# Patient Record
Sex: MALE | Race: ASIAN
[De-identification: names, ages, dates, MRNs, and addresses within clinical notes are randomized per-mention and may not be internally consistent; named-entity substitution may affect disease eponyms.]

---

## 2019-11-20 ENCOUNTER — HOSPITAL ENCOUNTER (INPATIENT)
Dept: HOSPITAL 54 - ER | Age: 79
LOS: 2 days | Discharge: ANOTHER HOSPITAL | DRG: 286 | End: 2019-11-22
Attending: NURSE PRACTITIONER | Admitting: FAMILY MEDICINE
Payer: MEDICARE

## 2019-11-20 VITALS — DIASTOLIC BLOOD PRESSURE: 91 MMHG | SYSTOLIC BLOOD PRESSURE: 144 MMHG

## 2019-11-20 VITALS — HEIGHT: 67 IN | WEIGHT: 156 LBS | BODY MASS INDEX: 24.48 KG/M2

## 2019-11-20 DIAGNOSIS — Z98.890: ICD-10-CM

## 2019-11-20 DIAGNOSIS — Z87.891: ICD-10-CM

## 2019-11-20 DIAGNOSIS — D72.829: ICD-10-CM

## 2019-11-20 DIAGNOSIS — E78.5: ICD-10-CM

## 2019-11-20 DIAGNOSIS — K76.89: ICD-10-CM

## 2019-11-20 DIAGNOSIS — N28.1: ICD-10-CM

## 2019-11-20 DIAGNOSIS — I50.21: ICD-10-CM

## 2019-11-20 DIAGNOSIS — Z82.3: ICD-10-CM

## 2019-11-20 DIAGNOSIS — I13.0: ICD-10-CM

## 2019-11-20 DIAGNOSIS — N17.0: ICD-10-CM

## 2019-11-20 DIAGNOSIS — N18.9: ICD-10-CM

## 2019-11-20 DIAGNOSIS — K76.1: ICD-10-CM

## 2019-11-20 DIAGNOSIS — I25.5: Primary | ICD-10-CM

## 2019-11-20 DIAGNOSIS — Z95.5: ICD-10-CM

## 2019-11-20 DIAGNOSIS — I25.10: ICD-10-CM

## 2019-11-20 LAB
ALBUMIN SERPL BCP-MCNC: 3.5 G/DL (ref 3.4–5)
ALP SERPL-CCNC: 109 U/L (ref 46–116)
ALT SERPL W P-5'-P-CCNC: 124 U/L (ref 12–78)
APPEARANCE UR: CLEAR
AST SERPL W P-5'-P-CCNC: 71 U/L (ref 15–37)
BASOPHILS # BLD AUTO: 0 /CMM (ref 0–0.2)
BASOPHILS NFR BLD AUTO: 0.2 % (ref 0–2)
BILIRUB DIRECT SERPL-MCNC: 0.3 MG/DL (ref 0–0.2)
BILIRUB SERPL-MCNC: 1.2 MG/DL (ref 0.2–1)
BILIRUB UR QL STRIP: NEGATIVE
BUN SERPL-MCNC: 31 MG/DL (ref 7–18)
CALCIUM SERPL-MCNC: 9.5 MG/DL (ref 8.5–10.1)
CHLORIDE SERPL-SCNC: 102 MMOL/L (ref 98–107)
CO2 SERPL-SCNC: 25 MMOL/L (ref 21–32)
COLOR UR: YELLOW
CREAT SERPL-MCNC: 1.6 MG/DL (ref 0.6–1.3)
EOSINOPHIL NFR BLD AUTO: 0.4 % (ref 0–6)
GLUCOSE SERPL-MCNC: 154 MG/DL (ref 74–106)
GLUCOSE UR STRIP-MCNC: NEGATIVE MG/DL
HCT VFR BLD AUTO: 47 % (ref 39–51)
HGB BLD-MCNC: 14.6 G/DL (ref 13.5–17.5)
HGB UR QL STRIP: NEGATIVE ERY/UL
KETONES UR STRIP-MCNC: NEGATIVE MG/DL
LEUKOCYTE ESTERASE UR QL STRIP: NEGATIVE
LYMPHOCYTES NFR BLD AUTO: 0.9 /CMM (ref 0.8–4.8)
LYMPHOCYTES NFR BLD AUTO: 5.6 % (ref 20–44)
MCHC RBC AUTO-ENTMCNC: 31 G/DL (ref 31–36)
MCV RBC AUTO: 83 FL (ref 80–96)
MONOCYTES NFR BLD AUTO: 1.4 /CMM (ref 0.1–1.3)
MONOCYTES NFR BLD AUTO: 8.9 % (ref 2–12)
NEUTROPHILS # BLD AUTO: 12.9 /CMM (ref 1.8–8.9)
NEUTROPHILS NFR BLD AUTO: 84.9 % (ref 43–81)
NITRITE UR QL STRIP: NEGATIVE
NT-PROBNP SERPL-MCNC: (no result) PG/ML (ref 0–125)
PH UR STRIP: 5.5 [PH] (ref 5–8)
PLATELET # BLD AUTO: 459 /CMM (ref 150–450)
POTASSIUM SERPL-SCNC: 5 MMOL/L (ref 3.5–5.1)
PROT SERPL-MCNC: 6.8 G/DL (ref 6.4–8.2)
PROT UR QL STRIP: NEGATIVE MG/DL
RBC # BLD AUTO: 5.7 MIL/UL (ref 4.5–6)
SODIUM SERPL-SCNC: 138 MMOL/L (ref 136–145)
UROBILINOGEN UR STRIP-MCNC: 0.2 EU/DL
WBC NRBC COR # BLD AUTO: 15.2 K/UL (ref 4.3–11)

## 2019-11-20 PROCEDURE — C1887 CATHETER, GUIDING: HCPCS

## 2019-11-20 PROCEDURE — C1769 GUIDE WIRE: HCPCS

## 2019-11-20 PROCEDURE — A9567 TECHNETIUM TC-99M AEROSOL: HCPCS

## 2019-11-20 PROCEDURE — A9540 TC99M MAA: HCPCS

## 2019-11-20 PROCEDURE — G0378 HOSPITAL OBSERVATION PER HR: HCPCS

## 2019-11-20 RX ADMIN — Medication PRN EACH: at 23:09

## 2019-11-20 NOTE — NUR
RN ADMITTING NOTES:



PATIENT IS AN 79-YEAR-OLD MALE ADMITTED TO [facility] WITH DX OF CHF EXACERBATION. PATIENT IS 
A&OX4, ABLE TO VERBALIZE NEEDS. WIFE AT BEDSIDE. VITAL SIGNS TAKEN. PLACED ON CARDIAC 
MONITOR SHOWING SINUS TACHY, . SKIN IS WARM AND INTACT. ON O2 AT 2LPM VIA NC, SATTING 
96%. NO RESPIRATORY DISTRESS. NO PAIN AT THIS TIME. IV ACCESS ON (R) AC #20 PATENT, INTACT, 
AND FLUSHING WELL. SALINE LOCKED. PATIENT WISHES TO BE FULL CODE. DR. GA AWARE. SAFETY 
PRECAUTIONS IMPLEMENTED. BED LOCKED AND IN LOWEST POSITION. CALL LIGHT WITHIN REACH. WILL 
CONT. TO MONITOR.

## 2019-11-20 NOTE — NUR
PT TRANSPORTED TO UNIT FRO NUCLEAR MED ON [facility]/ [facility] AT BEDSIDE. 
NAD NOTED DURING TRANSPORT. PT TRANSFERED FROM [facility] TO BED.

## 2019-11-20 NOTE — NUR
REPORT GIVEN TO TERRY PAREKH FOR LISANDRO. PT IS AWAITING VQ SCAN. PT IS GOING DIRECTLY 
TO ROOM 115-2 AFTER PROCEDURE. MD HACKETT

## 2019-11-21 VITALS — SYSTOLIC BLOOD PRESSURE: 117 MMHG | DIASTOLIC BLOOD PRESSURE: 64 MMHG

## 2019-11-21 VITALS — SYSTOLIC BLOOD PRESSURE: 123 MMHG | DIASTOLIC BLOOD PRESSURE: 90 MMHG

## 2019-11-21 VITALS — DIASTOLIC BLOOD PRESSURE: 76 MMHG | SYSTOLIC BLOOD PRESSURE: 125 MMHG

## 2019-11-21 VITALS — DIASTOLIC BLOOD PRESSURE: 64 MMHG | SYSTOLIC BLOOD PRESSURE: 117 MMHG

## 2019-11-21 VITALS — SYSTOLIC BLOOD PRESSURE: 128 MMHG | DIASTOLIC BLOOD PRESSURE: 68 MMHG

## 2019-11-21 VITALS — SYSTOLIC BLOOD PRESSURE: 118 MMHG | DIASTOLIC BLOOD PRESSURE: 76 MMHG

## 2019-11-21 VITALS — DIASTOLIC BLOOD PRESSURE: 73 MMHG | SYSTOLIC BLOOD PRESSURE: 121 MMHG

## 2019-11-21 LAB
BASOPHILS # BLD AUTO: 0 /CMM (ref 0–0.2)
BASOPHILS NFR BLD AUTO: 0.3 % (ref 0–2)
BUN SERPL-MCNC: 28 MG/DL (ref 7–18)
CALCIUM SERPL-MCNC: 8.4 MG/DL (ref 8.5–10.1)
CHLORIDE SERPL-SCNC: 104 MMOL/L (ref 98–107)
CHOLEST SERPL-MCNC: 108 MG/DL (ref ?–200)
CO2 SERPL-SCNC: 27 MMOL/L (ref 21–32)
CREAT SERPL-MCNC: 1.6 MG/DL (ref 0.6–1.3)
EOSINOPHIL NFR BLD AUTO: 1.5 % (ref 0–6)
GLUCOSE SERPL-MCNC: 83 MG/DL (ref 74–106)
HCT VFR BLD AUTO: 41 % (ref 39–51)
HDLC SERPL-MCNC: 23 MG/DL (ref 40–60)
HGB BLD-MCNC: 12.8 G/DL (ref 13.5–17.5)
LDLC SERPL DIRECT ASSAY-MCNC: 78 MG/DL (ref 0–99)
LYMPHOCYTES NFR BLD AUTO: 1.7 /CMM (ref 0.8–4.8)
LYMPHOCYTES NFR BLD AUTO: 13.9 % (ref 20–44)
MAGNESIUM SERPL-MCNC: 2 MG/DL (ref 1.8–2.4)
MCHC RBC AUTO-ENTMCNC: 31 G/DL (ref 31–36)
MCV RBC AUTO: 81 FL (ref 80–96)
MONOCYTES NFR BLD AUTO: 1.6 /CMM (ref 0.1–1.3)
MONOCYTES NFR BLD AUTO: 12.7 % (ref 2–12)
NEUTROPHILS # BLD AUTO: 8.9 /CMM (ref 1.8–8.9)
NEUTROPHILS NFR BLD AUTO: 71.6 % (ref 43–81)
PHOSPHATE SERPL-MCNC: 4 MG/DL (ref 2.5–4.9)
PLATELET # BLD AUTO: 337 /CMM (ref 150–450)
POTASSIUM SERPL-SCNC: 3.9 MMOL/L (ref 3.5–5.1)
RBC # BLD AUTO: 5.06 MIL/UL (ref 4.5–6)
SODIUM SERPL-SCNC: 139 MMOL/L (ref 136–145)
TRIGL SERPL-MCNC: 68 MG/DL (ref 30–150)
WBC NRBC COR # BLD AUTO: 12.4 K/UL (ref 4.3–11)

## 2019-11-21 RX ADMIN — ZOLPIDEM TARTRATE PRN MG: 5 TABLET, FILM COATED ORAL at 21:32

## 2019-11-21 RX ADMIN — Medication PRN EACH: at 20:42

## 2019-11-21 RX ADMIN — ZOLPIDEM TARTRATE PRN MG: 5 TABLET, FILM COATED ORAL at 01:28

## 2019-11-21 RX ADMIN — Medication SCH TAB: at 08:51

## 2019-11-21 NOTE — NUR
TELE RN CLOSING NOTE



PT AWAKE IN BED, ALERT AND ORIENTED X 4, ON 02 VIA NC 2L/MIN, SATURATING WELL, RESPIRATIONS 
EVEN AND UNLABORED, NO SIGNS OF RESPIRATORY DISTRESS NOTED. IV SITE ON RIGHT AC G20 INTACT, 
PATENT, WITH HEP LOCK IN PLACE. SINUS RHYTHM ON CARDIAC MONITOR. BED IN LOW POSITION, 
LOCKED, CALL LIGHT WITHIN REACH. CARDIAC CATH RESCHEDULED FOR 7AM TOMORROW MORNING. PT 
AWARE.

WILL ENDORSE TO NOC SHIFT NURSE.

## 2019-11-21 NOTE — NUR
TELE RN



RECEIVE PT A/O X 4 IN BED, AWAKE, NO SOB NO S/S OF DISTRESS, RESPIRATIONS EVEN AND 
UNLABORED. FOR CARDIAC CATH TOMORROW. SAFETY MEASURES IN PLACE. WILL CONT TO MONITOR

## 2019-11-21 NOTE — NUR
Met with patient, he is alert and pleasant. He lives locally with his wife in a single level 
dwelling.He is ambulatory and independent with adl's. States he is physically active and 
walks for 2 miles everyday. Has no DME or homehealth reported. He plan to return home once 
discharge. 










-------------------------------------------------------------------------------

Addendum: 11/21/19 at 2116 by JUVENTINO MUÑOZ RN

-------------------------------------------------------------------------------

Amended: Links added.

## 2019-11-21 NOTE — NUR
RN CLOSING NOTES:



PATIENT ASLEEP BUT EASILY AROUSABLE. NO RESPIRATORY DISTRESS. NO PAIN. ALL NEEDS MET AND 
ATTENDED. SAFETY PRECAUTIONS HAVE BEEN IMPLEMENTED. CALL LIGHT WITHIN REACH. ENDORSED TO AM 
SHIFT NURSE FOR CONTINUITY OF CARE.

## 2019-11-22 VITALS — SYSTOLIC BLOOD PRESSURE: 114 MMHG | DIASTOLIC BLOOD PRESSURE: 65 MMHG

## 2019-11-22 VITALS — DIASTOLIC BLOOD PRESSURE: 67 MMHG | SYSTOLIC BLOOD PRESSURE: 124 MMHG

## 2019-11-22 VITALS — DIASTOLIC BLOOD PRESSURE: 86 MMHG | SYSTOLIC BLOOD PRESSURE: 126 MMHG

## 2019-11-22 VITALS — DIASTOLIC BLOOD PRESSURE: 69 MMHG | SYSTOLIC BLOOD PRESSURE: 111 MMHG

## 2019-11-22 VITALS — SYSTOLIC BLOOD PRESSURE: 137 MMHG | DIASTOLIC BLOOD PRESSURE: 84 MMHG

## 2019-11-22 VITALS — SYSTOLIC BLOOD PRESSURE: 150 MMHG | DIASTOLIC BLOOD PRESSURE: 41 MMHG

## 2019-11-22 VITALS — DIASTOLIC BLOOD PRESSURE: 72 MMHG | SYSTOLIC BLOOD PRESSURE: 140 MMHG

## 2019-11-22 VITALS — SYSTOLIC BLOOD PRESSURE: 127 MMHG | DIASTOLIC BLOOD PRESSURE: 77 MMHG

## 2019-11-22 VITALS — DIASTOLIC BLOOD PRESSURE: 63 MMHG | SYSTOLIC BLOOD PRESSURE: 120 MMHG

## 2019-11-22 VITALS — DIASTOLIC BLOOD PRESSURE: 41 MMHG | SYSTOLIC BLOOD PRESSURE: 150 MMHG

## 2019-11-22 VITALS — SYSTOLIC BLOOD PRESSURE: 118 MMHG | DIASTOLIC BLOOD PRESSURE: 74 MMHG

## 2019-11-22 LAB
BASOPHILS # BLD AUTO: 0 /CMM (ref 0–0.2)
BASOPHILS NFR BLD AUTO: 0.1 % (ref 0–2)
BUN SERPL-MCNC: 30 MG/DL (ref 7–18)
CALCIUM SERPL-MCNC: 8.3 MG/DL (ref 8.5–10.1)
CHLORIDE SERPL-SCNC: 102 MMOL/L (ref 98–107)
CO2 SERPL-SCNC: 30 MMOL/L (ref 21–32)
CREAT SERPL-MCNC: 1.5 MG/DL (ref 0.6–1.3)
EOSINOPHIL NFR BLD AUTO: 1 % (ref 0–6)
GLUCOSE SERPL-MCNC: 104 MG/DL (ref 74–106)
HCT VFR BLD AUTO: 42 % (ref 39–51)
HGB BLD-MCNC: 13.5 G/DL (ref 13.5–17.5)
LYMPHOCYTES NFR BLD AUTO: 1.3 /CMM (ref 0.8–4.8)
LYMPHOCYTES NFR BLD AUTO: 8.4 % (ref 20–44)
MCHC RBC AUTO-ENTMCNC: 32 G/DL (ref 31–36)
MCV RBC AUTO: 80 FL (ref 80–96)
MONOCYTES NFR BLD AUTO: 1.7 /CMM (ref 0.1–1.3)
MONOCYTES NFR BLD AUTO: 11.1 % (ref 2–12)
NEUTROPHILS # BLD AUTO: 12.4 /CMM (ref 1.8–8.9)
NEUTROPHILS NFR BLD AUTO: 79.4 % (ref 43–81)
PLATELET # BLD AUTO: 337 /CMM (ref 150–450)
POTASSIUM SERPL-SCNC: 3.9 MMOL/L (ref 3.5–5.1)
RBC # BLD AUTO: 5.24 MIL/UL (ref 4.5–6)
SODIUM SERPL-SCNC: 137 MMOL/L (ref 136–145)
WBC NRBC COR # BLD AUTO: 15.7 K/UL (ref 4.3–11)

## 2019-11-22 PROCEDURE — 4A023N7 MEASUREMENT OF CARDIAC SAMPLING AND PRESSURE, LEFT HEART, PERCUTANEOUS APPROACH: ICD-10-PCS | Performed by: INTERNAL MEDICINE

## 2019-11-22 PROCEDURE — B211YZZ FLUOROSCOPY OF MULTIPLE CORONARY ARTERIES USING OTHER CONTRAST: ICD-10-PCS | Performed by: INTERNAL MEDICINE

## 2019-11-22 RX ADMIN — Medication SCH TAB: at 08:53

## 2019-11-22 NOTE — NUR
PAGED AND CALLED HOSPITALIST SPOKE TO DR. GA RELAYED PATIENT COMPLAINING OF SEVERE 
BACK PAIN 9-10 LOWER BACK. OBTAINED NEW ORDERS FROM DR. GA TORADOL 30 MG IVP ONE TIME 
ORDER ONLY ONCE. READ BACK AND VERIDIED ORDERS NOTED AND CARRIED OUT

## 2019-11-22 NOTE — NUR
RN NOTES



SEEN AND EXAMINED BY RAINA PATEL NP AND DR PACHECO. AWARE OF PT'S CURRENT STATUS. SP 
CARDIAC CATH. TR BAND IN PLACE. NO SIGNS OF CIRCULATORY IMPAIRMENT NOTED. POSSIBLE TRANSFER 
TO HIGHER LEVEL OF CARE FOR CABG. WILL CLOSELY MONITOR

## 2019-11-22 NOTE — NUR
RN NOTES



SPOKE WITH TIARRA () REGARDING TRANSFER TO HIGHER LEVEL OF CARE. PT WILL BE 
TRANSFERRED TO [facility]. P/U TIME 13:30. CLARIFIED WITH DR PACHECO IF PT OK TO 
TRANSFER WITH TR BAND. PT'S LATEST .5, FOLLOWING POST CARDIAC CATH PROTOCOL. PER MD, 
OK TO GO AHEAD WITH TR BAND REMOVAL. WILL CLOSELY MONITOR. PT AND WIFE AT BEDSIDE AWARE OF 
TRANSFER.

## 2019-11-22 NOTE — NUR
PT TRANSPORT TO CARDIAC CATH  BY TERRY [facility]IA [facility]. PT STABLE. VS STABLE. WILL ENODRSE 
NEXT SHIFT POC. 

-------------------------------------------------------------------------------

Addendum: 11/22/19 at 0653 by CHRISTEN ARGUELLES RN

-------------------------------------------------------------------------------

MAINTAINS NPO MIDNIGHT

## 2019-11-22 NOTE — NUR
PT NO C/O OF PAIN AT THIS TIME. RE ASSESS TRAMADOL 30 MG IVP PT VERBALIZED "THANK YOU IT 
RELIEVE MY BACK PAIN THAT TRAMADOL HELPS.

## 2019-11-22 NOTE — NUR
RN NOTES



CALLED [facility] #[phone], SPOKE WITH TERRY GARZA. ALL PERTINENT INFORMATION 
GIVEN. PT HANS BE TRANSFERRED FOR HIGHER LEVEL OF CARE.

## 2019-11-22 NOTE — NUR
RN NOTES



TR BAND ON RIGHT WRIST REMOVED. NO BLEEDING NOTED. COVERED WITH DRY DRESSING. NO CIRCULATORY 
IMPAIRMENT NOTED. WILL MONITOR

## 2019-11-22 NOTE — NUR
RN INITIAL NOTES



RECEIVED PT FROM CATH LAB. PT A/OX4. ON ROOM AIR. NO RESPIRATORY DISTRESS NOTED. NO SOB 
NOTED. DENIES ANY PAIN. TR BAND ON RIGHT WRIST IN PLACE. NO SIGNS OF BLEEDING NOTED. RADIAL 
PULSE PALPABLE. NO CIRCULATORY IMPAIRMENT NOTED. PLACED COMFORTABLY TO BED. CONNECTED TO 
MONITOR. WILL FOLLOW POST CATH LAB ORDERS PER PROTOCOL. PT ORIENTED TO ROOM AND USE OF CALL 
LIGHT. WILL CLOSELY MONITOR

## 2019-11-22 NOTE — NUR
RN NOTES



PT LEFT VIA [facility] TO [facility] FOR HIGHER LEVEL OF CARE. PT A/O, ON ROOM AIR. NO 
RESPIRATORY DISTRESS NOTED. NO SOB NOTED. DENIES ANY PAIN. VS STABLE. LEFT IN STABLE 
CONDITION

## 2019-11-22 NOTE — NUR
ASLEEP AND EASILY AWAKEN, PT SLEPT WELL. NO S/S OF DISTRESS, AM CARE RENDERED. KEPT CLEAN 
AND DRY AND COMFORTABLE AT ALL TIMES

-------------------------------------------------------------------------------

Addendum: 11/22/19 at 0638 by CHRISTEN ARGUELLES RN

-------------------------------------------------------------------------------

ON CARDIAC MONITORING SR 87 IN TELE MONITOR

## 2021-05-15 ENCOUNTER — HOSPITAL ENCOUNTER (INPATIENT)
Dept: HOSPITAL 54 - ER | Age: 81
LOS: 3 days | Discharge: HOME | DRG: 438 | End: 2021-05-18
Attending: NURSE PRACTITIONER | Admitting: NURSE PRACTITIONER
Payer: MEDICARE

## 2021-05-15 VITALS — HEIGHT: 66 IN | BODY MASS INDEX: 26.03 KG/M2 | WEIGHT: 162 LBS

## 2021-05-15 DIAGNOSIS — D72.829: ICD-10-CM

## 2021-05-15 DIAGNOSIS — R74.01: ICD-10-CM

## 2021-05-15 DIAGNOSIS — E88.09: ICD-10-CM

## 2021-05-15 DIAGNOSIS — N18.9: ICD-10-CM

## 2021-05-15 DIAGNOSIS — Z95.810: ICD-10-CM

## 2021-05-15 DIAGNOSIS — I25.5: ICD-10-CM

## 2021-05-15 DIAGNOSIS — D50.9: ICD-10-CM

## 2021-05-15 DIAGNOSIS — E44.1: ICD-10-CM

## 2021-05-15 DIAGNOSIS — Z95.5: ICD-10-CM

## 2021-05-15 DIAGNOSIS — Z87.01: ICD-10-CM

## 2021-05-15 DIAGNOSIS — E78.5: ICD-10-CM

## 2021-05-15 DIAGNOSIS — I27.20: ICD-10-CM

## 2021-05-15 DIAGNOSIS — K85.90: Primary | ICD-10-CM

## 2021-05-15 DIAGNOSIS — I25.10: ICD-10-CM

## 2021-05-15 DIAGNOSIS — Z66: ICD-10-CM

## 2021-05-15 DIAGNOSIS — N28.1: ICD-10-CM

## 2021-05-15 DIAGNOSIS — N17.0: ICD-10-CM

## 2021-05-15 DIAGNOSIS — I71.4: ICD-10-CM

## 2021-05-15 DIAGNOSIS — I50.43: ICD-10-CM

## 2021-05-15 DIAGNOSIS — Z20.822: ICD-10-CM

## 2021-05-15 DIAGNOSIS — E87.5: ICD-10-CM

## 2021-05-15 DIAGNOSIS — I13.0: ICD-10-CM

## 2021-05-15 DIAGNOSIS — J91.8: ICD-10-CM

## 2021-05-15 LAB
ALBUMIN SERPL BCP-MCNC: 3.3 G/DL (ref 3.4–5)
ALP SERPL-CCNC: 117 U/L (ref 46–116)
ALT SERPL W P-5'-P-CCNC: 217 U/L (ref 12–78)
AST SERPL W P-5'-P-CCNC: 65 U/L (ref 15–37)
BASOPHILS # BLD AUTO: 0.1 /CMM (ref 0–0.2)
BASOPHILS NFR BLD AUTO: 0.5 % (ref 0–2)
BILIRUB DIRECT SERPL-MCNC: 0.2 MG/DL (ref 0–0.2)
BILIRUB SERPL-MCNC: 0.4 MG/DL (ref 0.2–1)
BUN SERPL-MCNC: 45 MG/DL (ref 7–18)
CALCIUM SERPL-MCNC: 8.5 MG/DL (ref 8.5–10.1)
CHLORIDE SERPL-SCNC: 104 MMOL/L (ref 98–107)
CO2 SERPL-SCNC: 24 MMOL/L (ref 21–32)
CREAT SERPL-MCNC: 1.5 MG/DL (ref 0.6–1.3)
EOSINOPHIL NFR BLD AUTO: 3.9 % (ref 0–6)
GLUCOSE SERPL-MCNC: 98 MG/DL (ref 74–106)
HCT VFR BLD AUTO: 38 % (ref 39–51)
HGB BLD-MCNC: 11.6 G/DL (ref 13.5–17.5)
LYMPHOCYTES NFR BLD AUTO: 1.3 /CMM (ref 0.8–4.8)
LYMPHOCYTES NFR BLD AUTO: 10.6 % (ref 20–44)
MCHC RBC AUTO-ENTMCNC: 31 G/DL (ref 31–36)
MCV RBC AUTO: 78 FL (ref 80–96)
MONOCYTES NFR BLD AUTO: 1.2 /CMM (ref 0.1–1.3)
MONOCYTES NFR BLD AUTO: 10 % (ref 2–12)
NEUTROPHILS # BLD AUTO: 9.4 /CMM (ref 1.8–8.9)
NEUTROPHILS NFR BLD AUTO: 75 % (ref 43–81)
NT-PROBNP SERPL-MCNC: < 5 PG/ML (ref 0–125)
PLATELET # BLD AUTO: 584 /CMM (ref 150–450)
POTASSIUM SERPL-SCNC: 5 MMOL/L (ref 3.5–5.1)
PROT SERPL-MCNC: 6.3 G/DL (ref 6.4–8.2)
RBC # BLD AUTO: 4.78 MIL/UL (ref 4.5–6)
SODIUM SERPL-SCNC: 137 MMOL/L (ref 136–145)
WBC NRBC COR # BLD AUTO: 12.5 K/UL (ref 4.3–11)

## 2021-05-15 PROCEDURE — G0378 HOSPITAL OBSERVATION PER HR: HCPCS

## 2021-05-15 PROCEDURE — C9803 HOPD COVID-19 SPEC COLLECT: HCPCS

## 2021-05-15 NOTE — NUR
PT BIBFAMILY C/O ABD PAIN, WEAKNESS AND COUGH X 4 DAYS. PT ALSO COMPLAINS OF 
POOR APPETITE. PT AAOX4, VSS, RESPIRATIONS EVEN AND UNLABORED W/ NAD NOTED. PT 
CONNECTED TO THE CARDIAC MONITOR AND POX

## 2021-05-16 VITALS — SYSTOLIC BLOOD PRESSURE: 104 MMHG | DIASTOLIC BLOOD PRESSURE: 62 MMHG

## 2021-05-16 VITALS — SYSTOLIC BLOOD PRESSURE: 122 MMHG | DIASTOLIC BLOOD PRESSURE: 72 MMHG

## 2021-05-16 VITALS — SYSTOLIC BLOOD PRESSURE: 119 MMHG | DIASTOLIC BLOOD PRESSURE: 65 MMHG

## 2021-05-16 LAB
ALBUMIN SERPL BCP-MCNC: 3.2 G/DL (ref 3.4–5)
ALP SERPL-CCNC: 104 U/L (ref 46–116)
ALT SERPL W P-5'-P-CCNC: 196 U/L (ref 12–78)
AST SERPL W P-5'-P-CCNC: 58 U/L (ref 15–37)
BASOPHILS # BLD AUTO: 0.1 /CMM (ref 0–0.2)
BASOPHILS NFR BLD AUTO: 0.7 % (ref 0–2)
BILIRUB SERPL-MCNC: 0.6 MG/DL (ref 0.2–1)
BUN SERPL-MCNC: 43 MG/DL (ref 7–18)
CALCIUM SERPL-MCNC: 8.3 MG/DL (ref 8.5–10.1)
CHLORIDE SERPL-SCNC: 103 MMOL/L (ref 98–107)
CO2 SERPL-SCNC: 23 MMOL/L (ref 21–32)
CREAT SERPL-MCNC: 1.5 MG/DL (ref 0.6–1.3)
EOSINOPHIL NFR BLD AUTO: 4 % (ref 0–6)
FERRITIN SERPL-MCNC: 30 NG/ML (ref 8–388)
GLUCOSE SERPL-MCNC: 97 MG/DL (ref 74–106)
HCT VFR BLD AUTO: 35 % (ref 39–51)
HGB BLD-MCNC: 11.3 G/DL (ref 13.5–17.5)
IRON SERPL-MCNC: 15 UG/DL (ref 50–175)
LIPASE SERPL-CCNC: 521 U/L (ref 73–393)
LYMPHOCYTES NFR BLD AUTO: 1.5 /CMM (ref 0.8–4.8)
LYMPHOCYTES NFR BLD AUTO: 13.1 % (ref 20–44)
MAGNESIUM SERPL-MCNC: 2.5 MG/DL (ref 1.8–2.4)
MCHC RBC AUTO-ENTMCNC: 32 G/DL (ref 31–36)
MCV RBC AUTO: 78 FL (ref 80–96)
MONOCYTES NFR BLD AUTO: 1.1 /CMM (ref 0.1–1.3)
MONOCYTES NFR BLD AUTO: 9.8 % (ref 2–12)
NEUTROPHILS # BLD AUTO: 8.4 /CMM (ref 1.8–8.9)
NEUTROPHILS NFR BLD AUTO: 72.4 % (ref 43–81)
PHOSPHATE SERPL-MCNC: 3.9 MG/DL (ref 2.5–4.9)
PLATELET # BLD AUTO: 545 /CMM (ref 150–450)
POTASSIUM SERPL-SCNC: 4.6 MMOL/L (ref 3.5–5.1)
PROT SERPL-MCNC: 6.2 G/DL (ref 6.4–8.2)
RBC # BLD AUTO: 4.54 MIL/UL (ref 4.5–6)
SODIUM SERPL-SCNC: 136 MMOL/L (ref 136–145)
TIBC SERPL-MCNC: 331 UG/DL (ref 250–450)
WBC NRBC COR # BLD AUTO: 11.6 K/UL (ref 4.3–11)

## 2021-05-16 RX ADMIN — HEPARIN SODIUM SCH UNITS: 5000 INJECTION INTRAVENOUS; SUBCUTANEOUS at 09:06

## 2021-05-16 RX ADMIN — ASPIRIN 81 MG SCH MG: 81 TABLET ORAL at 09:03

## 2021-05-16 RX ADMIN — ZOLPIDEM TARTRATE PRN MG: 5 TABLET, FILM COATED ORAL at 02:46

## 2021-05-16 RX ADMIN — DEXTROSE MONOHYDRATE SCH MLS/HR: 50 INJECTION, SOLUTION INTRAVENOUS at 20:14

## 2021-05-16 RX ADMIN — HEPARIN SODIUM SCH UNITS: 5000 INJECTION INTRAVENOUS; SUBCUTANEOUS at 20:14

## 2021-05-16 RX ADMIN — ZOLPIDEM TARTRATE PRN MG: 5 TABLET, FILM COATED ORAL at 23:20

## 2021-05-16 RX ADMIN — HEPARIN SODIUM SCH UNITS: 5000 INJECTION INTRAVENOUS; SUBCUTANEOUS at 02:49

## 2021-05-16 NOTE — NUR
RN NOTES

 MEDICATION WERE ADMINISTERED FOR  NAUSEA EFFECTIVE, PATIENT STABLE DENIED PAIN, , SELF 
CARE. CALL LIGHT WITHIN TO REACH WILL MONITORING. ENDORSED ONCOMING NURSE FOLLOW PLAN OF 
CARE.

## 2021-05-16 NOTE — NUR
rn notes

 . CHF, EUVOLEMIC ON EXAM, POSSIBLE MILD CHF ON EXAM BUT WITH NL BNP, 2. COUGH

NOT ON ACE -I PULM EVAL.  3. ELEVATE TRANSAMINASES AND LIPASE, LIVER AND PANCREAS NL ON ABD 
Ct /PASSIVE CONGESTION WITH PULM HTn, 4. PULM HT PULM EVAL, CHECK LE DOPPLER. will 
monitoring.

## 2021-05-16 NOTE — NUR
RN NOTES

 PATIENT IN THE BED, NO SOB, COUGHING, ADMINISTERED SCHEDULED MEDICATION, VSS, PATIENT SELF 
CARE. AMBULATORY USING URINAL.  IV ACCESS ON RIGHT WEIST. CALL LIGHT WITHIN TO REACH. WILL 
MONITORING.

## 2021-05-16 NOTE — NUR
rn notes

 ECHO done EJ fraction is 20. seen patient via Dr Machado. according Md he will put orders.

## 2021-05-16 NOTE — NUR
RN NOTE

PT DENIES ANY PAIN N/V PT AMBULATES, NO CHEST SOB DURING SHIFT. URINAL AT BEDSIDE, CALL 
LIGHT WITHIN REACH ENDORSED TO AM RN FOR LISANDRO

## 2021-05-16 NOTE — NUR
DEONTE RN NOTE

PT ARRIVED TO UNIT B/P 119/70, P 92. RR 18, TEMP 97.9. ORALLY. PT ON ROOM AIR DENIES ANY 
PAIN AT THIS TIME. PT A/O X 3, IV TO RIGHT HAND PATENT INTACT AND FLUSHING WELL. SAFETY 
MEASURES IN PLACE BED LOCKED IN THE LOWEST POSITION SIDE RAILS UP X 2 CALL LIGHT WITHIN 
REACH. AWAITING FOR MD ORDERS. WILL CONT. TO MONITOR PT.

## 2021-05-17 VITALS — DIASTOLIC BLOOD PRESSURE: 65 MMHG | SYSTOLIC BLOOD PRESSURE: 107 MMHG

## 2021-05-17 VITALS — SYSTOLIC BLOOD PRESSURE: 119 MMHG | DIASTOLIC BLOOD PRESSURE: 64 MMHG

## 2021-05-17 VITALS — SYSTOLIC BLOOD PRESSURE: 100 MMHG | DIASTOLIC BLOOD PRESSURE: 62 MMHG

## 2021-05-17 LAB
ALBUMIN SERPL BCP-MCNC: 2.8 G/DL (ref 3.4–5)
ALP SERPL-CCNC: 93 U/L (ref 46–116)
ALT SERPL W P-5'-P-CCNC: 161 U/L (ref 12–78)
AST SERPL W P-5'-P-CCNC: 44 U/L (ref 15–37)
BASOPHILS # BLD AUTO: 0.1 /CMM (ref 0–0.2)
BASOPHILS NFR BLD AUTO: 1 % (ref 0–2)
BILIRUB SERPL-MCNC: 0.6 MG/DL (ref 0.2–1)
BUN SERPL-MCNC: 30 MG/DL (ref 7–18)
CALCIUM SERPL-MCNC: 8.1 MG/DL (ref 8.5–10.1)
CHLORIDE SERPL-SCNC: 106 MMOL/L (ref 98–107)
CHOLEST SERPL-MCNC: 109 MG/DL (ref ?–200)
CK SERPL-CCNC: 72 U/L (ref 39–308)
CO2 SERPL-SCNC: 23 MMOL/L (ref 21–32)
CREAT SERPL-MCNC: 1.3 MG/DL (ref 0.6–1.3)
EOSINOPHIL NFR BLD AUTO: 7.9 % (ref 0–6)
GLUCOSE SERPL-MCNC: 88 MG/DL (ref 74–106)
HCT VFR BLD AUTO: 35 % (ref 39–51)
HDLC SERPL-MCNC: 25 MG/DL (ref 40–60)
HGB BLD-MCNC: 10.7 G/DL (ref 13.5–17.5)
LDLC SERPL DIRECT ASSAY-MCNC: 71 MG/DL (ref 0–99)
LIPASE SERPL-CCNC: 217 U/L (ref 73–393)
LYMPHOCYTES NFR BLD AUTO: 1.2 /CMM (ref 0.8–4.8)
LYMPHOCYTES NFR BLD AUTO: 13.3 % (ref 20–44)
MAGNESIUM SERPL-MCNC: 2.3 MG/DL (ref 1.8–2.4)
MCHC RBC AUTO-ENTMCNC: 31 G/DL (ref 31–36)
MCV RBC AUTO: 79 FL (ref 80–96)
MONOCYTES NFR BLD AUTO: 1 /CMM (ref 0.1–1.3)
MONOCYTES NFR BLD AUTO: 10.9 % (ref 2–12)
NEUTROPHILS # BLD AUTO: 6.2 /CMM (ref 1.8–8.9)
NEUTROPHILS NFR BLD AUTO: 66.9 % (ref 43–81)
PHOSPHATE SERPL-MCNC: 3.5 MG/DL (ref 2.5–4.9)
PLATELET # BLD AUTO: 472 /CMM (ref 150–450)
POTASSIUM SERPL-SCNC: 5.1 MMOL/L (ref 3.5–5.1)
PROT SERPL-MCNC: 5.5 G/DL (ref 6.4–8.2)
RBC # BLD AUTO: 4.38 MIL/UL (ref 4.5–6)
SODIUM SERPL-SCNC: 138 MMOL/L (ref 136–145)
TRIGL SERPL-MCNC: 67 MG/DL (ref 30–150)
WBC NRBC COR # BLD AUTO: 9.2 K/UL (ref 4.3–11)

## 2021-05-17 RX ADMIN — HEPARIN SODIUM SCH UNITS: 5000 INJECTION INTRAVENOUS; SUBCUTANEOUS at 09:10

## 2021-05-17 RX ADMIN — HEPARIN SODIUM SCH UNITS: 5000 INJECTION INTRAVENOUS; SUBCUTANEOUS at 21:24

## 2021-05-17 RX ADMIN — DEXTROSE MONOHYDRATE SCH MLS/HR: 50 INJECTION, SOLUTION INTRAVENOUS at 21:12

## 2021-05-17 RX ADMIN — ASPIRIN 81 MG SCH MG: 81 TABLET ORAL at 09:07

## 2021-05-17 NOTE — NUR
MS RN AM NOTE



RECEIVED PATIENT IN BED RESTING ALERT ORIENTED X4, ABLE TO EXPRESS NEEDS, ON ROOM AIR, 98% 
SATURATION, DENIES PAIN, IV SITE IS ON RIGHT HAND FLUSHES WELL, SITE CLEAR, AMBULATORY 
CONTINENT TO BOWEL/BLADDER,SAFETY MEASURE IMPLEMENT CALL LIGHT WITHIN REACH,BED IN LOW 
POSITION AND LOCKED,BED ALARM IS ON PLAN OF CARE DISCUSSED WITH PATIENT, VERBALIZED 
UNDERSTANDING. WILL CONTINUE TO MONITOR.

## 2021-05-17 NOTE — NUR
MS RN AM NOTE



PATIENT IN BED RESTING ALERT ORIENTED X4, ABLE TO EXPRESS NEEDS, ON ROOM AIR, 98% 
SATURATION, DENIES PAIN, IV SITE IS ON RIGHT HAND FLUSHES WELL, SITE CLEAR, AMBULATORY 
CONTINENT TO BOWEL/BLADDER,SAFETY MEASURE IMPLEMENT CALL LIGHT WITHIN REACH,BED IN LOW 
POSITION AND LOCKED,BED ALARM IS ON, ALL NEEDS MET. NO OTHER SIGNIFICANT CHANGE IN 
CONDITION. WILL ENDORSE TO NEXT SHIFT FOR LISANDRO.



FOR DC TOMORROW.

## 2021-05-17 NOTE — NUR
RN OPENING NOTE



RECEIVED PATIENT IN BED RESTING ALERT ORIENTED X4 VERBALLY RESPONSIVE ON ROOM AIR O2:97% IV 
SITE IS ON RIGHT HAND INTACT PATENT,AMBULATORY CONTINENT TO BOWEL/BLADDER,SAFETY MEASURE 
IMPLEMENT CALL LIGHT WITHIN REACH,BED IN LOW POSITION AND LOCKED,BED ALARM IS ON CONTINUE TO 
MONITOR.

## 2021-05-17 NOTE — NUR
RN CLOSING NOTE



PATIENT REMAINS ON ALERT ORIENTED X4 VERBALLY RESPONSIVE ON ROOM AIR O2:97% NO SOB NOT ACUTE 
DISTRESS NOTED,ALL DUE MEDS GIVEN AS MD ORDERED KEPT CLEAN AND DRY ALL THE TIME,KEPT 
COMFORTABLE ALL NEEDS MET ENDORSE NEXT COMING SHIFT FOR CONTINUATION OF CARE.

## 2021-05-18 VITALS — DIASTOLIC BLOOD PRESSURE: 53 MMHG | SYSTOLIC BLOOD PRESSURE: 94 MMHG

## 2021-05-18 VITALS — DIASTOLIC BLOOD PRESSURE: 80 MMHG | SYSTOLIC BLOOD PRESSURE: 102 MMHG

## 2021-05-18 VITALS — SYSTOLIC BLOOD PRESSURE: 124 MMHG | DIASTOLIC BLOOD PRESSURE: 75 MMHG

## 2021-05-18 LAB
ALBUMIN SERPL ELPH-MCNC: 3.1 G/DL (ref 2.9–4.4)
ALBUMIN/GLOB SERPL: 1.4 {RATIO} (ref 0.7–1.7)
ALPHA1 GLOB SERPL ELPH-MCNC: 0.2 G/DL (ref 0–0.4)
ALPHA2 GLOB SERPL ELPH-MCNC: 0.6 G/DL (ref 0.4–1)
B-GLOBULIN SERPL ELPH-MCNC: 0.8 G/DL (ref 0.7–1.3)
BASOPHILS # BLD AUTO: 0.1 /CMM (ref 0–0.2)
BASOPHILS NFR BLD AUTO: 0.8 % (ref 0–2)
BILIRUB UR QL STRIP: NEGATIVE
BUN SERPL-MCNC: 33 MG/DL (ref 7–18)
CALCIUM SERPL-MCNC: 8.5 MG/DL (ref 8.5–10.1)
CHLORIDE SERPL-SCNC: 104 MMOL/L (ref 98–107)
CO2 SERPL-SCNC: 24 MMOL/L (ref 21–32)
COLOR UR: YELLOW
CREAT SERPL-MCNC: 1.4 MG/DL (ref 0.6–1.3)
EOSINOPHIL NFR BLD AUTO: 5.8 % (ref 0–6)
GAMMA GLOB SERPL ELPH-MCNC: 0.6 G/DL (ref 0.4–1.8)
GLOBULIN SER CALC-MCNC: 2.2 G/DL (ref 2.2–3.9)
GLUCOSE SERPL-MCNC: 160 MG/DL (ref 74–106)
GLUCOSE UR STRIP-MCNC: NEGATIVE MG/DL
HCT VFR BLD AUTO: 37 % (ref 39–51)
HGB BLD-MCNC: 11.4 G/DL (ref 13.5–17.5)
LEUKOCYTE ESTERASE UR QL STRIP: NEGATIVE
LYMPHOCYTES NFR BLD AUTO: 1.6 /CMM (ref 0.8–4.8)
LYMPHOCYTES NFR BLD AUTO: 13.7 % (ref 20–44)
MCHC RBC AUTO-ENTMCNC: 31 G/DL (ref 31–36)
MCV RBC AUTO: 79 FL (ref 80–96)
MONOCYTES NFR BLD AUTO: 1 /CMM (ref 0.1–1.3)
MONOCYTES NFR BLD AUTO: 9 % (ref 2–12)
NEUTROPHILS # BLD AUTO: 8 /CMM (ref 1.8–8.9)
NEUTROPHILS NFR BLD AUTO: 70.7 % (ref 43–81)
NITRITE UR QL STRIP: NEGATIVE
PH UR STRIP: 5 [PH] (ref 5–8)
PLATELET # BLD AUTO: 551 /CMM (ref 150–450)
POTASSIUM SERPL-SCNC: 5.3 MMOL/L (ref 3.5–5.1)
PROT UR QL STRIP: NEGATIVE MG/DL
PTH-INTACT SERPL-MCNC: 42 PG/ML (ref 15–65)
RBC # BLD AUTO: 4.69 MIL/UL (ref 4.5–6)
SODIUM SERPL-SCNC: 136 MMOL/L (ref 136–145)
UROBILINOGEN UR STRIP-MCNC: 0.2 EU/DL
WBC NRBC COR # BLD AUTO: 11.4 K/UL (ref 4.3–11)

## 2021-05-18 RX ADMIN — HEPARIN SODIUM SCH UNITS: 5000 INJECTION INTRAVENOUS; SUBCUTANEOUS at 09:02

## 2021-05-18 RX ADMIN — ZOLPIDEM TARTRATE PRN MG: 5 TABLET, FILM COATED ORAL at 00:47

## 2021-05-18 RX ADMIN — ASPIRIN 81 MG SCH MG: 81 TABLET ORAL at 09:28

## 2021-05-18 NOTE — NUR
RN OPENING NOTES

Patient is alert and oriented and does not c/o pain or discomfort. On room air with 02 
saturation of 98%. Patient is able to ambulate and educated regarding safety and fall 
precautions. Will continue to monitor. Call light within reach.

## 2021-05-18 NOTE — NUR
DISCHARGE NOTES

Patient left the facility to home with daughter. Patient was in stable condition upon 
discharge. Educated regarding smoking, alcohol use and material provided regarding dx. 
Patient instructed to follow up with PCP and cardiology. Information for MD Woodward provided 
per patient's request. Iv line discontinued. No s/s of bleeding noted. All belongings given 
and provided to the patient.

## 2021-06-15 ENCOUNTER — HOSPITAL ENCOUNTER (INPATIENT)
Dept: HOSPITAL 12 - REHABOV3 | Age: 81
LOS: 2 days | Discharge: ANOTHER HOSPITAL | DRG: 57 | End: 2021-06-17
Attending: PHYSICAL MEDICINE & REHABILITATION | Admitting: PHYSICAL MEDICINE & REHABILITATION
Payer: MEDICARE

## 2021-06-15 VITALS — BODY MASS INDEX: 27.4 KG/M2 | WEIGHT: 174.56 LBS | HEIGHT: 67 IN

## 2021-06-15 VITALS — DIASTOLIC BLOOD PRESSURE: 72 MMHG | SYSTOLIC BLOOD PRESSURE: 115 MMHG

## 2021-06-15 DIAGNOSIS — I69.354: Primary | ICD-10-CM

## 2021-06-15 DIAGNOSIS — I69.391: ICD-10-CM

## 2021-06-15 DIAGNOSIS — R13.10: ICD-10-CM

## 2021-06-15 DIAGNOSIS — Z79.02: ICD-10-CM

## 2021-06-15 DIAGNOSIS — E78.5: ICD-10-CM

## 2021-06-15 DIAGNOSIS — I65.29: ICD-10-CM

## 2021-06-15 DIAGNOSIS — I69.393: ICD-10-CM

## 2021-06-15 DIAGNOSIS — I69.322: ICD-10-CM

## 2021-06-15 DIAGNOSIS — I50.9: ICD-10-CM

## 2021-06-15 DIAGNOSIS — N17.9: ICD-10-CM

## 2021-06-15 DIAGNOSIS — I25.10: ICD-10-CM

## 2021-06-15 DIAGNOSIS — R07.9: ICD-10-CM

## 2021-06-15 DIAGNOSIS — Z95.810: ICD-10-CM

## 2021-06-15 DIAGNOSIS — I11.0: ICD-10-CM

## 2021-06-15 DIAGNOSIS — F32.9: ICD-10-CM

## 2021-06-15 NOTE — NUR
Pt arrived at 2023 via [facility] from [facility]. Admitting Dx Acute CVA.  AAO x4. No 
acute distress noted. Denies pain/ discomfort. VIP group contacted and made aware of 
admission.  Dr. Ruelas notified of admission. Med recon verified to continue all meds by 
Dr. Blanca [facility]. Oriented pt to unit. Needs attend too. Physical assessment completed. 
Skin intact.  Safety measures maintained. Bed alarm on. Call light and personal items within 
reach. Will continue to monitor.

## 2021-06-16 VITALS — SYSTOLIC BLOOD PRESSURE: 100 MMHG | DIASTOLIC BLOOD PRESSURE: 58 MMHG

## 2021-06-16 VITALS — DIASTOLIC BLOOD PRESSURE: 75 MMHG | SYSTOLIC BLOOD PRESSURE: 131 MMHG

## 2021-06-16 VITALS — DIASTOLIC BLOOD PRESSURE: 61 MMHG | SYSTOLIC BLOOD PRESSURE: 103 MMHG

## 2021-06-16 VITALS — DIASTOLIC BLOOD PRESSURE: 71 MMHG | SYSTOLIC BLOOD PRESSURE: 122 MMHG

## 2021-06-16 VITALS — SYSTOLIC BLOOD PRESSURE: 125 MMHG | DIASTOLIC BLOOD PRESSURE: 76 MMHG

## 2021-06-16 VITALS — DIASTOLIC BLOOD PRESSURE: 59 MMHG | SYSTOLIC BLOOD PRESSURE: 102 MMHG

## 2021-06-16 LAB
ALP SERPL-CCNC: 200 U/L (ref 50–136)
ALT SERPL W/O P-5'-P-CCNC: 59 U/L (ref 16–63)
AST SERPL-CCNC: 39 U/L (ref 15–37)
BILIRUB SERPL-MCNC: 0.8 MG/DL (ref 0.2–1)
BUN SERPL-MCNC: 47 MG/DL (ref 7–18)
BUN SERPL-MCNC: 54 MG/DL (ref 7–18)
CHLORIDE SERPL-SCNC: 107 MMOL/L (ref 98–107)
CHLORIDE SERPL-SCNC: 109 MMOL/L (ref 98–107)
CO2 SERPL-SCNC: 24 MMOL/L (ref 21–32)
CO2 SERPL-SCNC: 26 MMOL/L (ref 21–32)
CREAT SERPL-MCNC: 1.7 MG/DL (ref 0.6–1.3)
CREAT SERPL-MCNC: 1.8 MG/DL (ref 0.6–1.3)
GLUCOSE SERPL-MCNC: 107 MG/DL (ref 74–106)
GLUCOSE SERPL-MCNC: 98 MG/DL (ref 74–106)
HCT VFR BLD AUTO: 39.4 % (ref 36.7–47.1)
MAGNESIUM SERPL-MCNC: 2.3 MG/DL (ref 1.8–2.4)
MCH RBC QN AUTO: 23.3 UUG (ref 23.8–33.4)
MCV RBC AUTO: 78.6 FL (ref 73–96.2)
PLATELET # BLD AUTO: 337 K/UL (ref 152–348)
POTASSIUM SERPL-SCNC: 4.7 MMOL/L (ref 3.5–5.1)
POTASSIUM SERPL-SCNC: 5.3 MMOL/L (ref 3.5–5.1)
WS STN SPEC: 5.6 G/DL (ref 6.4–8.2)

## 2021-06-16 RX ADMIN — CARVEDILOL SCH MG: 3.12 TABLET, FILM COATED ORAL at 18:29

## 2021-06-16 RX ADMIN — CARVEDILOL SCH MG: 3.12 TABLET, FILM COATED ORAL at 09:20

## 2021-06-16 NOTE — NUR
CHUY NOTE:



SW met with patient and conducted the PHQ0 post stroke depression screening. Patient scored 
a level of 16 which indicates moderately severe on the screening survey. SW requested a 
psychiatry consultation from TERRY Nugent to request from doctor. Patient expressed feeling 
depressed and suicidal however patient did not state an intent or plan to commit suicide. 



 informed patient about the importance of stroke. Patient accepted resources 
although  provided stroke referrals such as: Stroke Family Warmline at 
(4-558-6-STROKE) and Caring for a stroke survivor (1-496.474.7869).  also 
educated patient on the signs of Stroke and to immediately call 911.  provided 
patient with a stroke educational packet with information such as; Dietary food, what stroke 
is, risks, emotional support, finding support, medical management, and effects of stroke.

## 2021-06-16 NOTE — NUR
received pt resting in bed, family at bedside. Denies any discomfort/ pain. Axox3, able to 
make needs known. VSS. All due medications administered and tolerated well.   Pt able to 
ambulate safely to restroom with FWW. Safety measures maintained. 

2100 Dr. Reese at bedside, assessed pt and new orders in place. Patient is comfortable and 
all personal items within reach. Continue plan of care .

## 2021-06-16 NOTE — NUR
Per PT patient able to participate and ambulate with FWW, however had to take breaks to use 
restroom due to Kayexalate administration. Per PT, pt reported "heartburn pain" during 
session. Upon assessment, pt denied pain and stated "I think it was because of coffee". Pt 
also reported weakness today. Vital signs taken L eye edema noted, pt denied pain but stated 
eye felt "itchy" and that it had "been going on for a while". Dr Coffman notified with new 
orders in place, will carry out.

## 2021-06-16 NOTE — NUR
Pt c/o of nausea and had an episode of emesis. Notified Nephrology group. Dr. Blanca Tim 
on call with new orders of Zofran 4mg PO PRN, administered, will continue to monitor.

## 2021-06-16 NOTE — NUR
Dr. Coffman notified of  request for psych consult, new order in place. [facility] charge nurse 
notified, face sheet faxed.

## 2021-06-16 NOTE — NUR
Dr. Coffman at bedside, spoke with pt and pt's wife Kristine. Pt alert, awake, eating dinner. VSS 
at this time. Due medication administered per order. Pt able to ambulate safely to restroom 
with FWW. Safety maintained. Will endorse to night shift nurse for continuity of care.

## 2021-06-16 NOTE — NUR
pt complaint of chest pain. Sates " intense, feels like my heart is being squeezed" 7/10 
pain. Denies any other symptoms, no SOB, pain is not radiating. VVS: /63 HR 81 O2 95%. 
Dr. Petty Hoffman made aware with new orders of stat EKG, Troponin, potassium, CMP, and 
magnesium. EKG results sinus rhythm with PAC and Elevated troponin 0.077, Dr.Cheryl Hoffman 
made aware. New order of repeat troponin in 6hr. Administered Klonopin. Will continue to 
monitor pt.

## 2021-06-17 ENCOUNTER — HOSPITAL ENCOUNTER (INPATIENT)
Dept: HOSPITAL 12 - TELE3 | Age: 81
Discharge: ANOTHER HOSPITAL | DRG: 282 | End: 2021-06-17
Payer: MEDICARE

## 2021-06-17 VITALS — SYSTOLIC BLOOD PRESSURE: 124 MMHG | DIASTOLIC BLOOD PRESSURE: 53 MMHG

## 2021-06-17 VITALS — SYSTOLIC BLOOD PRESSURE: 145 MMHG | DIASTOLIC BLOOD PRESSURE: 79 MMHG

## 2021-06-17 VITALS — BODY MASS INDEX: 27.31 KG/M2 | HEIGHT: 67 IN | WEIGHT: 174 LBS

## 2021-06-17 VITALS — SYSTOLIC BLOOD PRESSURE: 113 MMHG | DIASTOLIC BLOOD PRESSURE: 68 MMHG

## 2021-06-17 DIAGNOSIS — Z98.61: ICD-10-CM

## 2021-06-17 DIAGNOSIS — Z20.822: ICD-10-CM

## 2021-06-17 DIAGNOSIS — I25.5: ICD-10-CM

## 2021-06-17 DIAGNOSIS — I25.10: ICD-10-CM

## 2021-06-17 DIAGNOSIS — I21.4: Primary | ICD-10-CM

## 2021-06-17 DIAGNOSIS — Z86.73: ICD-10-CM

## 2021-06-17 PROCEDURE — G0378 HOSPITAL OBSERVATION PER HR: HCPCS

## 2021-06-17 RX ADMIN — ATORVASTATIN CALCIUM SCH MG: 40 TABLET, FILM COATED ORAL at 20:24

## 2021-06-17 RX ADMIN — ATORVASTATIN CALCIUM SCH MG: 40 TABLET, FILM COATED ORAL at 10:32

## 2021-06-17 NOTE — NUR
Received PTT 31.7, Dr. Woody Coronado made aware ordered heparin Bolus, order carried out.     
pharmacy made aware. Will endorse to oncoming shift.

## 2021-06-17 NOTE — NUR
Patient transferred from ARU to Telemetry. Patient awake, alert and orientedx4. On room air. 
Patient denies pain/ discomfort at this time. patient with right FA #20 gauge IV heplock. 
Will continue to monitor.

## 2021-06-17 NOTE — NUR
Patient started on Heparin/ D5W drip 500ml per MD order. Heparin drip starting at 1185 units 
at 23.7ml/hr, per protocol. Patient tolerating well. Will continue to monitor.

## 2021-06-17 NOTE — NUR
pt is sleeping, no acute distress noted. VSS. pt complain of mild chest pain 3-4/10, states 
" feel a little better. Will continue to monitor.

## 2021-06-17 NOTE — NUR
Transported to [facility] via [facility]. No signs of acute distress, no complains of 
chest pain, dizziness or lightheadedness. Okay to transfer with heparin drip on hold per Dr. Tillman. Belongings with the pt. Stable.

## 2021-06-17 NOTE — NUR
Received critical troponin 6.605 MD made aware with new orders to transfer to Telemetry and 
start Heparin drip per pharmacy.

Pt is resting in bed no acute distress at this time denies any chest pain at the moment. No 
SOB noted. Vitals /79 Hr89 O2 96%.

## 2021-06-17 NOTE — NUR
Received pt in bed, awake and verbally responsive, able to make needs known. Denies any 
chest pain, dizziness or lightheadedness. Pt with ongoing heparin drip at 935 units on his R 
forearm, infusing well. Safety measures initiated, call light within reach. For transfer to 
[facility] tonight, will continue to monitor.

## 2021-06-30 ENCOUNTER — HOSPITAL ENCOUNTER (INPATIENT)
Dept: HOSPITAL 12 - REHABOV3 | Age: 81
LOS: 6 days | Discharge: ANOTHER HOSPITAL | DRG: 264 | End: 2021-07-06
Attending: PHYSICAL MEDICINE & REHABILITATION | Admitting: PHYSICAL MEDICINE & REHABILITATION
Payer: MEDICARE

## 2021-06-30 VITALS — DIASTOLIC BLOOD PRESSURE: 63 MMHG | SYSTOLIC BLOOD PRESSURE: 120 MMHG

## 2021-06-30 VITALS — WEIGHT: 174 LBS | BODY MASS INDEX: 27.31 KG/M2 | HEIGHT: 67 IN

## 2021-06-30 DIAGNOSIS — Z95.810: ICD-10-CM

## 2021-06-30 DIAGNOSIS — T45.515A: ICD-10-CM

## 2021-06-30 DIAGNOSIS — I25.10: ICD-10-CM
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
## 2021-06-30 DIAGNOSIS — I69.354: ICD-10-CM

## 2021-06-30 DIAGNOSIS — T82.838A: ICD-10-CM

## 2021-06-30 DIAGNOSIS — K21.9: ICD-10-CM

## 2021-06-30 DIAGNOSIS — R11.2: ICD-10-CM

## 2021-06-30 DIAGNOSIS — I73.9: ICD-10-CM

## 2021-06-30 DIAGNOSIS — E43: ICD-10-CM

## 2021-06-30 DIAGNOSIS — Z95.5: ICD-10-CM

## 2021-06-30 DIAGNOSIS — N17.0: ICD-10-CM

## 2021-06-30 DIAGNOSIS — I50.22: ICD-10-CM

## 2021-06-30 DIAGNOSIS — I42.9: ICD-10-CM

## 2021-06-30 DIAGNOSIS — E78.5: ICD-10-CM

## 2021-06-30 DIAGNOSIS — I13.0: ICD-10-CM

## 2021-06-30 DIAGNOSIS — N14.1: ICD-10-CM

## 2021-06-30 DIAGNOSIS — I25.5: Primary | ICD-10-CM

## 2021-06-30 DIAGNOSIS — I50.82: ICD-10-CM

## 2021-06-30 DIAGNOSIS — A41.9: ICD-10-CM

## 2021-06-30 DIAGNOSIS — I51.3: ICD-10-CM
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
## 2021-06-30 DIAGNOSIS — N18.9: ICD-10-CM

## 2021-06-30 DIAGNOSIS — D68.59: ICD-10-CM

## 2021-06-30 DIAGNOSIS — G93.40: ICD-10-CM
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
## 2021-06-30 DIAGNOSIS — D63.8: ICD-10-CM

## 2021-06-30 DIAGNOSIS — R18.8: ICD-10-CM

## 2021-06-30 DIAGNOSIS — I21.4: ICD-10-CM

## 2021-06-30 PROCEDURE — A4663 DIALYSIS BLOOD PRESSURE CUFF: HCPCS

## 2021-06-30 RX ADMIN — CARVEDILOL SCH MG: 3.12 TABLET, FILM COATED ORAL at 20:43

## 2021-06-30 RX ADMIN — CLONAZEPAM PRN MG: 1 TABLET ORAL at 21:30

## 2021-06-30 RX ADMIN — DOXEPIN HYDROCHLORIDE SCH MG: 10 CAPSULE ORAL at 21:02

## 2021-07-01 VITALS — DIASTOLIC BLOOD PRESSURE: 71 MMHG | SYSTOLIC BLOOD PRESSURE: 106 MMHG

## 2021-07-01 VITALS — DIASTOLIC BLOOD PRESSURE: 60 MMHG | SYSTOLIC BLOOD PRESSURE: 103 MMHG

## 2021-07-01 VITALS — DIASTOLIC BLOOD PRESSURE: 69 MMHG | SYSTOLIC BLOOD PRESSURE: 110 MMHG

## 2021-07-01 VITALS — DIASTOLIC BLOOD PRESSURE: 68 MMHG | SYSTOLIC BLOOD PRESSURE: 117 MMHG

## 2021-07-01 LAB
ALP SERPL-CCNC: 316 U/L (ref 50–136)
ALT SERPL W/O P-5'-P-CCNC: 508 U/L (ref 16–63)
AMORPH URATE CRY URNS QL MICRO: (no result) /HPF
APPEARANCE UR: CLEAR
AST SERPL-CCNC: 287 U/L (ref 15–37)
BILIRUB SERPL-MCNC: 4.4 MG/DL (ref 0.2–1)
BILIRUB UR QL STRIP: (no result)
BUN SERPL-MCNC: 91 MG/DL (ref 7–18)
CHLORIDE SERPL-SCNC: 102 MMOL/L (ref 98–107)
CO2 SERPL-SCNC: 23 MMOL/L (ref 21–32)
COARSE GRAN CASTS URNS QL MICRO: (no result) /LPF
COLOR UR: YELLOW
CREAT SERPL-MCNC: 4.8 MG/DL (ref 0.6–1.3)
CREAT UR-MCNC: 149.1 MG/DL (ref 30–125)
DEPRECATED SQUAMOUS URNS QL MICRO: (no result) /HPF
GLUCOSE SERPL-MCNC: 130 MG/DL (ref 74–106)
GLUCOSE UR STRIP-MCNC: NEGATIVE MG/DL
HCT VFR BLD AUTO: 44.3 % (ref 36.7–47.1)
HEMOCCULT STL QL: POSITIVE
HGB UR QL STRIP: (no result)
KETONES UR STRIP-MCNC: NEGATIVE MG/DL
LEUKOCYTE ESTERASE UR QL STRIP: (no result)
MAGNESIUM SERPL-MCNC: 2.7 MG/DL (ref 1.8–2.4)
MCH RBC QN AUTO: 22.6 UUG (ref 23.8–33.4)
MCV RBC AUTO: 75.6 FL (ref 73–96.2)
NITRITE UR QL STRIP: NEGATIVE
PH UR STRIP: 5 [PH] (ref 5–8)
PHOSPHATE SERPL-MCNC: 6.3 MG/DL (ref 2.5–4.9)
PLATELET # BLD AUTO: 187 K/UL (ref 152–348)
POTASSIUM SERPL-SCNC: 4 MMOL/L (ref 3.5–5.1)
PROT UR-MCNC: 229.2 MG/DL
RBC #/AREA URNS HPF: (no result) /HPF (ref 0–3)
SP GR UR STRIP: 1.02 (ref 1–1.03)
UROBILINOGEN UR STRIP-MCNC: 2 E.U./DL
WBC #/AREA URNS HPF: (no result) /HPF
WS STN SPEC: 6.1 G/DL (ref 6.4–8.2)

## 2021-07-01 RX ADMIN — FAMOTIDINE SCH MG: 20 TABLET, FILM COATED ORAL at 13:39

## 2021-07-01 RX ADMIN — CARVEDILOL SCH MG: 3.12 TABLET, FILM COATED ORAL at 18:00

## 2021-07-01 RX ADMIN — ESCITALOPRAM OXALATE SCH MG: 10 TABLET, FILM COATED ORAL at 09:39

## 2021-07-01 RX ADMIN — CARVEDILOL SCH MG: 3.12 TABLET, FILM COATED ORAL at 09:39

## 2021-07-01 RX ADMIN — ATORVASTATIN CALCIUM SCH MG: 40 TABLET, FILM COATED ORAL at 09:39

## 2021-07-01 RX ADMIN — POLYETHYLENE GLYCOL 3350 SCH GM: 17 POWDER, FOR SOLUTION ORAL at 09:39

## 2021-07-01 RX ADMIN — CLOPIDOGREL BISULFATE SCH MG: 75 TABLET ORAL at 09:39

## 2021-07-01 RX ADMIN — ASPIRIN SCH MG: 81 TABLET, COATED ORAL at 13:39

## 2021-07-01 RX ADMIN — DOXEPIN HYDROCHLORIDE SCH MG: 10 CAPSULE ORAL at 21:00

## 2021-07-02 VITALS — SYSTOLIC BLOOD PRESSURE: 110 MMHG | DIASTOLIC BLOOD PRESSURE: 69 MMHG

## 2021-07-02 VITALS — DIASTOLIC BLOOD PRESSURE: 72 MMHG | SYSTOLIC BLOOD PRESSURE: 115 MMHG

## 2021-07-02 VITALS — SYSTOLIC BLOOD PRESSURE: 126 MMHG | DIASTOLIC BLOOD PRESSURE: 63 MMHG

## 2021-07-02 VITALS — SYSTOLIC BLOOD PRESSURE: 95 MMHG | DIASTOLIC BLOOD PRESSURE: 51 MMHG

## 2021-07-02 LAB
ALP SERPL-CCNC: 240 U/L (ref 50–136)
ALT SERPL W/O P-5'-P-CCNC: 380 U/L (ref 16–63)
AST SERPL-CCNC: 181 U/L (ref 15–37)
BILIRUB SERPL-MCNC: 3.5 MG/DL (ref 0.2–1)
BUN SERPL-MCNC: 92 MG/DL (ref 7–18)
CHLORIDE SERPL-SCNC: 104 MMOL/L (ref 98–107)
CK SERPL-CCNC: 57 U/L (ref 39–308)
CO2 SERPL-SCNC: 24 MMOL/L (ref 21–32)
CREAT SERPL-MCNC: 4.9 MG/DL (ref 0.6–1.3)
EOSINOPHIL NFR BLD MANUAL: 2 % (ref 0–8)
FERRITIN SERPL-MCNC: 532 NG/ML (ref 26–388)
GLUCOSE SERPL-MCNC: 103 MG/DL (ref 74–106)
HCT VFR BLD AUTO: 36.5 % (ref 36.7–47.1)
IRON SERPL-MCNC: 72 UG/DL (ref 50–175)
LYMPHOCYTES NFR BLD MANUAL: 5 % (ref 20–40)
MAGNESIUM SERPL-MCNC: 2.6 MG/DL (ref 1.8–2.4)
MCH RBC QN AUTO: 22.8 UUG (ref 23.8–33.4)
MCV RBC AUTO: 73.8 FL (ref 73–96.2)
MONOCYTES NFR BLD MANUAL: 8 % (ref 2–10)
NEUTS SEG NFR BLD MANUAL: 85 % (ref 42–75)
PHOSPHATE SERPL-MCNC: 6.4 MG/DL (ref 2.5–4.9)
PLATELET # BLD AUTO: 176 K/UL (ref 152–348)
POTASSIUM SERPL-SCNC: 3.7 MMOL/L (ref 3.5–5.1)
WS STN SPEC: 5 G/DL (ref 6.4–8.2)

## 2021-07-02 RX ADMIN — FAMOTIDINE SCH MG: 20 TABLET, FILM COATED ORAL at 09:00

## 2021-07-02 RX ADMIN — CLOPIDOGREL BISULFATE SCH MG: 75 TABLET ORAL at 09:00

## 2021-07-02 RX ADMIN — ATORVASTATIN CALCIUM SCH MG: 40 TABLET, FILM COATED ORAL at 09:00

## 2021-07-02 RX ADMIN — POLYETHYLENE GLYCOL 3350 SCH GM: 17 POWDER, FOR SOLUTION ORAL at 09:00

## 2021-07-02 RX ADMIN — DOXEPIN HYDROCHLORIDE SCH MG: 10 CAPSULE ORAL at 20:34

## 2021-07-02 RX ADMIN — ESCITALOPRAM OXALATE SCH MG: 10 TABLET, FILM COATED ORAL at 09:00

## 2021-07-02 RX ADMIN — CLONAZEPAM PRN MG: 1 TABLET ORAL at 00:29

## 2021-07-02 RX ADMIN — CARVEDILOL SCH MG: 3.12 TABLET, FILM COATED ORAL at 18:43

## 2021-07-02 RX ADMIN — CARVEDILOL SCH MG: 3.12 TABLET, FILM COATED ORAL at 11:46

## 2021-07-02 RX ADMIN — ASPIRIN SCH MG: 81 TABLET, COATED ORAL at 09:00

## 2021-07-03 VITALS — DIASTOLIC BLOOD PRESSURE: 65 MMHG | SYSTOLIC BLOOD PRESSURE: 101 MMHG

## 2021-07-03 VITALS — SYSTOLIC BLOOD PRESSURE: 98 MMHG | DIASTOLIC BLOOD PRESSURE: 54 MMHG

## 2021-07-03 VITALS — DIASTOLIC BLOOD PRESSURE: 55 MMHG | SYSTOLIC BLOOD PRESSURE: 102 MMHG

## 2021-07-03 VITALS — SYSTOLIC BLOOD PRESSURE: 101 MMHG | DIASTOLIC BLOOD PRESSURE: 61 MMHG

## 2021-07-03 LAB
ALP SERPL-CCNC: 215 U/L (ref 50–136)
ALT SERPL W/O P-5'-P-CCNC: 306 U/L (ref 16–63)
AST SERPL-CCNC: 102 U/L (ref 15–37)
BILIRUB SERPL-MCNC: 3.5 MG/DL (ref 0.2–1)
BUN SERPL-MCNC: 93 MG/DL (ref 7–18)
CHLORIDE SERPL-SCNC: 101 MMOL/L (ref 98–107)
CHOLEST SERPL-MCNC: 52 MG/DL (ref ?–200)
CO2 SERPL-SCNC: 24 MMOL/L (ref 21–32)
CREAT SERPL-MCNC: 5 MG/DL (ref 0.6–1.3)
GLUCOSE SERPL-MCNC: 85 MG/DL (ref 74–106)
HCT VFR BLD AUTO: 34.2 % (ref 36.7–47.1)
HDLC SERPL-MCNC: 11 MG/DL (ref 40–60)
MAGNESIUM SERPL-MCNC: 2.6 MG/DL (ref 1.8–2.4)
MCH RBC QN AUTO: 23.4 UUG (ref 23.8–33.4)
MCV RBC AUTO: 73.3 FL (ref 73–96.2)
PHOSPHATE SERPL-MCNC: 6.6 MG/DL (ref 2.5–4.9)
PLATELET # BLD AUTO: 166 K/UL (ref 152–348)
POTASSIUM SERPL-SCNC: 4 MMOL/L (ref 3.5–5.1)
TRIGL SERPL-MCNC: 67 MG/DL (ref 30–150)
TSH SERPL DL<=0.005 MIU/L-ACNC: 5.34 MIU/ML (ref 0.36–3.74)
WS STN SPEC: 4.9 G/DL (ref 6.4–8.2)

## 2021-07-03 PROCEDURE — 05HA33Z INSERTION OF INFUSION DEVICE INTO LEFT BRACHIAL VEIN, PERCUTANEOUS APPROACH: ICD-10-PCS

## 2021-07-03 RX ADMIN — POLYETHYLENE GLYCOL 3350 SCH GM: 17 POWDER, FOR SOLUTION ORAL at 10:24

## 2021-07-03 RX ADMIN — CLOPIDOGREL BISULFATE SCH MG: 75 TABLET ORAL at 10:20

## 2021-07-03 RX ADMIN — SODIUM CHLORIDE PRN MLS/HR: 0.45 INJECTION, SOLUTION INTRAVENOUS at 12:56

## 2021-07-03 RX ADMIN — ASPIRIN SCH MG: 81 TABLET, COATED ORAL at 10:21

## 2021-07-03 RX ADMIN — FAMOTIDINE SCH MG: 20 TABLET, FILM COATED ORAL at 10:22

## 2021-07-03 RX ADMIN — ESCITALOPRAM OXALATE SCH MG: 10 TABLET, FILM COATED ORAL at 10:21

## 2021-07-03 RX ADMIN — CARVEDILOL SCH MG: 3.12 TABLET, FILM COATED ORAL at 17:54

## 2021-07-03 RX ADMIN — DOXEPIN HYDROCHLORIDE SCH MG: 10 CAPSULE ORAL at 20:23

## 2021-07-03 RX ADMIN — ATORVASTATIN CALCIUM SCH MG: 40 TABLET, FILM COATED ORAL at 10:21

## 2021-07-03 RX ADMIN — CARVEDILOL SCH MG: 3.12 TABLET, FILM COATED ORAL at 10:21

## 2021-07-04 VITALS — DIASTOLIC BLOOD PRESSURE: 52 MMHG | SYSTOLIC BLOOD PRESSURE: 110 MMHG

## 2021-07-04 VITALS — DIASTOLIC BLOOD PRESSURE: 57 MMHG | SYSTOLIC BLOOD PRESSURE: 105 MMHG

## 2021-07-04 VITALS — SYSTOLIC BLOOD PRESSURE: 108 MMHG | DIASTOLIC BLOOD PRESSURE: 60 MMHG

## 2021-07-04 VITALS — DIASTOLIC BLOOD PRESSURE: 49 MMHG | SYSTOLIC BLOOD PRESSURE: 103 MMHG

## 2021-07-04 VITALS — SYSTOLIC BLOOD PRESSURE: 92 MMHG | DIASTOLIC BLOOD PRESSURE: 55 MMHG

## 2021-07-04 LAB
AMORPH URATE CRY URNS QL MICRO: (no result) /HPF
APPEARANCE UR: (no result)
BILIRUB UR QL STRIP: NEGATIVE
COLOR UR: YELLOW
DEPRECATED SQUAMOUS URNS QL MICRO: (no result) /HPF
GLUCOSE UR STRIP-MCNC: NEGATIVE MG/DL
HGB UR QL STRIP: (no result)
KETONES UR STRIP-MCNC: NEGATIVE MG/DL
LEUKOCYTE ESTERASE UR QL STRIP: (no result)
NITRITE UR QL STRIP: NEGATIVE
PH UR STRIP: 5.5 [PH] (ref 5–8)
RBC #/AREA URNS HPF: (no result) /HPF (ref 0–3)
SP GR UR STRIP: 1.02 (ref 1–1.03)
UROBILINOGEN UR STRIP-MCNC: 1 E.U./DL
WBC #/AREA URNS HPF: (no result) /HPF
WBC #/AREA URNS HPF: (no result) /HPF (ref 0–3)

## 2021-07-04 RX ADMIN — DOXEPIN HYDROCHLORIDE SCH MG: 10 CAPSULE ORAL at 21:04

## 2021-07-04 RX ADMIN — CLOPIDOGREL BISULFATE SCH MG: 75 TABLET ORAL at 08:52

## 2021-07-04 RX ADMIN — ATORVASTATIN CALCIUM SCH MG: 40 TABLET, FILM COATED ORAL at 21:04

## 2021-07-04 RX ADMIN — ASPIRIN SCH MG: 81 TABLET, COATED ORAL at 08:52

## 2021-07-04 RX ADMIN — FAMOTIDINE SCH MG: 20 TABLET, FILM COATED ORAL at 08:53

## 2021-07-04 RX ADMIN — ESCITALOPRAM OXALATE SCH MG: 10 TABLET, FILM COATED ORAL at 08:53

## 2021-07-04 RX ADMIN — CLONAZEPAM PRN MG: 1 TABLET ORAL at 21:04

## 2021-07-04 RX ADMIN — PIPERACILLIN SODIUM AND TAZOBACTAM SODIUM SCH MLS/HR: .25; 2 INJECTION, POWDER, LYOPHILIZED, FOR SOLUTION INTRAVENOUS at 14:59

## 2021-07-04 RX ADMIN — PIPERACILLIN SODIUM AND TAZOBACTAM SODIUM SCH MLS/HR: .25; 2 INJECTION, POWDER, LYOPHILIZED, FOR SOLUTION INTRAVENOUS at 21:04

## 2021-07-04 RX ADMIN — CARVEDILOL SCH MG: 3.12 TABLET, FILM COATED ORAL at 18:00

## 2021-07-04 RX ADMIN — SODIUM CHLORIDE PRN MLS/HR: 0.45 INJECTION, SOLUTION INTRAVENOUS at 02:43

## 2021-07-04 RX ADMIN — POLYETHYLENE GLYCOL 3350 SCH GM: 17 POWDER, FOR SOLUTION ORAL at 08:53

## 2021-07-04 RX ADMIN — CARVEDILOL SCH MG: 3.12 TABLET, FILM COATED ORAL at 08:00

## 2021-07-04 RX ADMIN — PIPERACILLIN SODIUM AND TAZOBACTAM SODIUM SCH MLS/HR: .25; 2 INJECTION, POWDER, LYOPHILIZED, FOR SOLUTION INTRAVENOUS at 11:43

## 2021-07-05 VITALS — DIASTOLIC BLOOD PRESSURE: 68 MMHG | SYSTOLIC BLOOD PRESSURE: 118 MMHG

## 2021-07-05 VITALS — DIASTOLIC BLOOD PRESSURE: 68 MMHG | SYSTOLIC BLOOD PRESSURE: 123 MMHG

## 2021-07-05 VITALS — DIASTOLIC BLOOD PRESSURE: 59 MMHG | SYSTOLIC BLOOD PRESSURE: 103 MMHG

## 2021-07-05 VITALS — SYSTOLIC BLOOD PRESSURE: 130 MMHG | DIASTOLIC BLOOD PRESSURE: 61 MMHG

## 2021-07-05 LAB
APPEARANCE UR: (no result)
BILIRUB UR QL STRIP: NEGATIVE
BUN SERPL-MCNC: 91 MG/DL (ref 7–18)
CHLORIDE SERPL-SCNC: 101 MMOL/L (ref 98–107)
CO2 SERPL-SCNC: 25 MMOL/L (ref 21–32)
COLOR UR: YELLOW
CREAT SERPL-MCNC: 4.5 MG/DL (ref 0.6–1.3)
CREAT UR-MCNC: 91 MG/DL (ref 30–125)
DEPRECATED SQUAMOUS URNS QL MICRO: (no result) /HPF
GLUCOSE SERPL-MCNC: 146 MG/DL (ref 74–106)
GLUCOSE UR STRIP-MCNC: NEGATIVE MG/DL
HGB UR QL STRIP: (no result)
KETONES UR STRIP-MCNC: NEGATIVE MG/DL
LEUKOCYTE ESTERASE UR QL STRIP: (no result)
NITRITE UR QL STRIP: NEGATIVE
PH UR STRIP: 5.5 [PH] (ref 5–8)
POTASSIUM SERPL-SCNC: 3.3 MMOL/L (ref 3.5–5.1)
PROT UR-MCNC: 82.4 MG/DL
RBC #/AREA URNS HPF: (no result) /HPF (ref 0–3)
SP GR UR STRIP: 1.02 (ref 1–1.03)
URATE CRY URNS QL MICRO: (no result) /HPF
UROBILINOGEN UR STRIP-MCNC: 1 E.U./DL
WBC #/AREA URNS HPF: (no result) /HPF
WBC #/AREA URNS HPF: (no result) /HPF (ref 0–3)

## 2021-07-05 PROCEDURE — 05HM33Z INSERTION OF INFUSION DEVICE INTO RIGHT INTERNAL JUGULAR VEIN, PERCUTANEOUS APPROACH: ICD-10-PCS

## 2021-07-05 PROCEDURE — B543ZZA ULTRASONOGRAPHY OF RIGHT JUGULAR VEINS, GUIDANCE: ICD-10-PCS

## 2021-07-05 RX ADMIN — CLOPIDOGREL BISULFATE SCH MG: 75 TABLET ORAL at 08:29

## 2021-07-05 RX ADMIN — CARVEDILOL SCH MG: 3.12 TABLET, FILM COATED ORAL at 09:00

## 2021-07-05 RX ADMIN — ESCITALOPRAM OXALATE SCH MG: 10 TABLET, FILM COATED ORAL at 08:28

## 2021-07-05 RX ADMIN — CLONAZEPAM PRN MG: 1 TABLET ORAL at 20:30

## 2021-07-05 RX ADMIN — SODIUM CHLORIDE PRN MG: 9 INJECTION, SOLUTION INTRAVENOUS at 17:47

## 2021-07-05 RX ADMIN — CARVEDILOL SCH MG: 3.12 TABLET, FILM COATED ORAL at 18:00

## 2021-07-05 RX ADMIN — POLYETHYLENE GLYCOL 3350 SCH GM: 17 POWDER, FOR SOLUTION ORAL at 08:29

## 2021-07-05 RX ADMIN — PIPERACILLIN SODIUM AND TAZOBACTAM SODIUM SCH MLS/HR: .25; 2 INJECTION, POWDER, LYOPHILIZED, FOR SOLUTION INTRAVENOUS at 05:07

## 2021-07-05 RX ADMIN — ASPIRIN SCH MG: 81 TABLET, COATED ORAL at 08:28

## 2021-07-05 RX ADMIN — FAMOTIDINE SCH MG: 20 TABLET, FILM COATED ORAL at 08:28

## 2021-07-05 RX ADMIN — ATORVASTATIN CALCIUM SCH MG: 40 TABLET, FILM COATED ORAL at 20:30

## 2021-07-05 RX ADMIN — DOXEPIN HYDROCHLORIDE SCH MG: 10 CAPSULE ORAL at 20:30

## 2021-07-05 RX ADMIN — PIPERACILLIN SODIUM AND TAZOBACTAM SODIUM SCH MLS/HR: .25; 2 INJECTION, POWDER, LYOPHILIZED, FOR SOLUTION INTRAVENOUS at 17:28

## 2021-07-05 RX ADMIN — SODIUM CHLORIDE PRN MG: 9 INJECTION, SOLUTION INTRAVENOUS at 20:29

## 2021-07-06 ENCOUNTER — HOSPITAL ENCOUNTER (INPATIENT)
Dept: HOSPITAL 12 - MEDSURG3 | Age: 81
LOS: 7 days | Discharge: NURSING, REHAB OR LONG-TERM CARE | DRG: 682 | End: 2021-07-13
Attending: INTERNAL MEDICINE
Payer: MEDICARE

## 2021-07-06 VITALS — SYSTOLIC BLOOD PRESSURE: 108 MMHG | DIASTOLIC BLOOD PRESSURE: 59 MMHG

## 2021-07-06 VITALS — HEIGHT: 67 IN | WEIGHT: 162.13 LBS | BODY MASS INDEX: 25.45 KG/M2

## 2021-07-06 VITALS — DIASTOLIC BLOOD PRESSURE: 68 MMHG | SYSTOLIC BLOOD PRESSURE: 121 MMHG

## 2021-07-06 VITALS — DIASTOLIC BLOOD PRESSURE: 64 MMHG | SYSTOLIC BLOOD PRESSURE: 106 MMHG

## 2021-07-06 VITALS — DIASTOLIC BLOOD PRESSURE: 48 MMHG | SYSTOLIC BLOOD PRESSURE: 101 MMHG

## 2021-07-06 DIAGNOSIS — Z86.73: ICD-10-CM

## 2021-07-06 DIAGNOSIS — D72.829: ICD-10-CM

## 2021-07-06 DIAGNOSIS — N17.0: Primary | ICD-10-CM

## 2021-07-06 DIAGNOSIS — I25.2: ICD-10-CM

## 2021-07-06 DIAGNOSIS — K76.7: ICD-10-CM

## 2021-07-06 DIAGNOSIS — R53.1: ICD-10-CM

## 2021-07-06 DIAGNOSIS — I50.43: ICD-10-CM

## 2021-07-06 DIAGNOSIS — I25.10: ICD-10-CM

## 2021-07-06 DIAGNOSIS — R18.8: ICD-10-CM

## 2021-07-06 DIAGNOSIS — E80.6: ICD-10-CM

## 2021-07-06 DIAGNOSIS — Z74.09: ICD-10-CM

## 2021-07-06 DIAGNOSIS — D68.59: ICD-10-CM

## 2021-07-06 DIAGNOSIS — Z95.810: ICD-10-CM

## 2021-07-06 DIAGNOSIS — Z95.5: ICD-10-CM

## 2021-07-06 DIAGNOSIS — I13.0: ICD-10-CM

## 2021-07-06 DIAGNOSIS — E78.5: ICD-10-CM

## 2021-07-06 DIAGNOSIS — Z79.82: ICD-10-CM

## 2021-07-06 DIAGNOSIS — R31.9: ICD-10-CM

## 2021-07-06 DIAGNOSIS — T45.515A: ICD-10-CM

## 2021-07-06 DIAGNOSIS — J90: ICD-10-CM

## 2021-07-06 DIAGNOSIS — Z20.822: ICD-10-CM

## 2021-07-06 DIAGNOSIS — Z87.440: ICD-10-CM

## 2021-07-06 DIAGNOSIS — D63.8: ICD-10-CM

## 2021-07-06 DIAGNOSIS — F32.9: ICD-10-CM

## 2021-07-06 DIAGNOSIS — Z79.02: ICD-10-CM

## 2021-07-06 DIAGNOSIS — N18.4: ICD-10-CM

## 2021-07-06 DIAGNOSIS — N14.1: ICD-10-CM

## 2021-07-06 DIAGNOSIS — R74.8: ICD-10-CM

## 2021-07-06 DIAGNOSIS — E43: ICD-10-CM

## 2021-07-06 DIAGNOSIS — I25.5: ICD-10-CM

## 2021-07-06 DIAGNOSIS — E66.9: ICD-10-CM

## 2021-07-06 DIAGNOSIS — D50.9: ICD-10-CM

## 2021-07-06 LAB
ALBUMIN SERPL ELPH-MCNC: 2.7 G/DL (ref 2.9–4.4)
ALBUMIN/GLOB SERPL: 1.1 {RATIO} (ref 0.7–1.7)
ALP SERPL-CCNC: 200 U/L (ref 50–136)
ALPHA1 GLOB SERPL ELPH-MCNC: 0.4 G/DL (ref 0–0.4)
ALPHA2 GLOB SERPL ELPH-MCNC: 0.5 G/DL (ref 0.4–1)
ALT SERPL W/O P-5'-P-CCNC: 172 U/L (ref 16–63)
AST SERPL-CCNC: 40 U/L (ref 15–37)
B-GLOBULIN SERPL ELPH-MCNC: 0.9 G/DL (ref 0.7–1.3)
BASOPHILS NFR BLD MANUAL: 1 % (ref 0–2)
BILIRUB SERPL-MCNC: 2.7 MG/DL (ref 0.2–1)
BUN SERPL-MCNC: 85 MG/DL (ref 7–18)
CHLORIDE SERPL-SCNC: 103 MMOL/L (ref 98–107)
CO2 SERPL-SCNC: 25 MMOL/L (ref 21–32)
CREAT SERPL-MCNC: 4.1 MG/DL (ref 0.6–1.3)
EOSINOPHIL NFR BLD MANUAL: 1 % (ref 0–8)
GAMMA GLOB SERPL ELPH-MCNC: 0.6 G/DL (ref 0.4–1.8)
GLOBULIN SER CALC-MCNC: 2.4 G/DL (ref 2.2–3.9)
GLUCOSE SERPL-MCNC: 111 MG/DL (ref 74–106)
HCT VFR BLD AUTO: 33.8 % (ref 36.7–47.1)
HCT VFR BLD AUTO: 35.5 % (ref 36.7–47.1)
HCT VFR BLD AUTO: 35.6 % (ref 36.7–47.1)
LYMPHOCYTES NFR BLD MANUAL: 6 % (ref 20–40)
MAGNESIUM SERPL-MCNC: 2.5 MG/DL (ref 1.8–2.4)
MCH RBC QN AUTO: 23.5 UUG (ref 23.8–33.4)
MCV RBC AUTO: 74.7 FL (ref 73–96.2)
MONOCYTES NFR BLD MANUAL: 7 % (ref 2–10)
NEUTS SEG NFR BLD MANUAL: 85 % (ref 42–75)
PHOSPHATE SERPL-MCNC: 5.7 MG/DL (ref 2.5–4.9)
PLATELET # BLD AUTO: 190 K/UL (ref 152–348)
POTASSIUM SERPL-SCNC: 3.8 MMOL/L (ref 3.5–5.1)
WS STN SPEC: 4.9 G/DL (ref 6.4–8.2)

## 2021-07-06 PROCEDURE — G0378 HOSPITAL OBSERVATION PER HR: HCPCS

## 2021-07-06 PROCEDURE — 0W363ZZ CONTROL BLEEDING IN NECK, PERCUTANEOUS APPROACH: ICD-10-PCS

## 2021-07-06 PROCEDURE — P9047 ALBUMIN (HUMAN), 25%, 50ML: HCPCS

## 2021-07-06 RX ADMIN — POLYETHYLENE GLYCOL 3350 SCH GM: 17 POWDER, FOR SOLUTION ORAL at 09:00

## 2021-07-06 RX ADMIN — CLOPIDOGREL BISULFATE SCH MG: 75 TABLET ORAL at 12:33

## 2021-07-06 RX ADMIN — CLOPIDOGREL BISULFATE SCH MG: 75 TABLET ORAL at 09:00

## 2021-07-06 RX ADMIN — ESCITALOPRAM OXALATE SCH MG: 10 TABLET, FILM COATED ORAL at 11:42

## 2021-07-06 RX ADMIN — ASPIRIN SCH MG: 81 TABLET, COATED ORAL at 09:00

## 2021-07-06 RX ADMIN — CARVEDILOL SCH MG: 3.12 TABLET, FILM COATED ORAL at 08:00

## 2021-07-06 RX ADMIN — DOXEPIN HYDROCHLORIDE SCH MG: 10 CAPSULE ORAL at 21:49

## 2021-07-06 RX ADMIN — CLONAZEPAM PRN MG: 1 TABLET ORAL at 21:19

## 2021-07-06 RX ADMIN — PIPERACILLIN SODIUM AND TAZOBACTAM SODIUM SCH MLS/HR: .25; 2 INJECTION, POWDER, LYOPHILIZED, FOR SOLUTION INTRAVENOUS at 06:09

## 2021-07-06 RX ADMIN — ASPIRIN SCH MG: 81 TABLET, COATED ORAL at 12:33

## 2021-07-06 RX ADMIN — FAMOTIDINE SCH MG: 20 TABLET, FILM COATED ORAL at 09:00

## 2021-07-06 NOTE — NUR
patient is admitted from rehab unit, alert, oriented x3, catheter for dialysis is intact at 
right side of neck, no active bleeding noted, dressing intact, patient is getting dialyzed, 
MD Coffman made aware that meds need to be recon.

## 2021-07-07 VITALS — DIASTOLIC BLOOD PRESSURE: 72 MMHG | SYSTOLIC BLOOD PRESSURE: 117 MMHG

## 2021-07-07 VITALS — SYSTOLIC BLOOD PRESSURE: 103 MMHG | DIASTOLIC BLOOD PRESSURE: 60 MMHG

## 2021-07-07 VITALS — SYSTOLIC BLOOD PRESSURE: 117 MMHG | DIASTOLIC BLOOD PRESSURE: 70 MMHG

## 2021-07-07 LAB
ALP SERPL-CCNC: 190 U/L (ref 50–136)
ALT SERPL W/O P-5'-P-CCNC: 175 U/L (ref 16–63)
AST SERPL-CCNC: 43 U/L (ref 15–37)
BILIRUB SERPL-MCNC: 2.8 MG/DL (ref 0.2–1)
BUN SERPL-MCNC: 63 MG/DL (ref 7–18)
CHLORIDE SERPL-SCNC: 104 MMOL/L (ref 98–107)
CO2 SERPL-SCNC: 23 MMOL/L (ref 21–32)
CREAT SERPL-MCNC: 3.2 MG/DL (ref 0.6–1.3)
GLUCOSE SERPL-MCNC: 118 MG/DL (ref 74–106)
HCT VFR BLD AUTO: 35.1 % (ref 36.7–47.1)
MCH RBC QN AUTO: 23.8 UUG (ref 23.8–33.4)
MCV RBC AUTO: 75.8 FL (ref 73–96.2)
PLATELET # BLD AUTO: 209 K/UL (ref 152–348)
POTASSIUM SERPL-SCNC: 4 MMOL/L (ref 3.5–5.1)
WS STN SPEC: 5.6 G/DL (ref 6.4–8.2)

## 2021-07-07 PROCEDURE — 5A1D70Z PERFORMANCE OF URINARY FILTRATION, INTERMITTENT, LESS THAN 6 HOURS PER DAY: ICD-10-PCS

## 2021-07-07 RX ADMIN — CLONAZEPAM PRN MG: 1 TABLET ORAL at 21:38

## 2021-07-07 RX ADMIN — DOXEPIN HYDROCHLORIDE SCH MG: 10 CAPSULE ORAL at 20:02

## 2021-07-07 RX ADMIN — FAMOTIDINE SCH MG: 20 TABLET, FILM COATED ORAL at 08:38

## 2021-07-07 RX ADMIN — CLOPIDOGREL BISULFATE SCH MG: 75 TABLET ORAL at 08:38

## 2021-07-07 RX ADMIN — ATORVASTATIN CALCIUM SCH MG: 40 TABLET, FILM COATED ORAL at 20:01

## 2021-07-07 RX ADMIN — ESCITALOPRAM OXALATE SCH MG: 10 TABLET, FILM COATED ORAL at 08:38

## 2021-07-07 RX ADMIN — CARVEDILOL SCH MG: 3.12 TABLET, FILM COATED ORAL at 17:52

## 2021-07-07 RX ADMIN — POLYETHYLENE GLYCOL 3350 SCH GM: 17 POWDER, FOR SOLUTION ORAL at 09:19

## 2021-07-07 RX ADMIN — ASPIRIN SCH MG: 81 TABLET, COATED ORAL at 08:37

## 2021-07-07 NOTE — NUR
Pt remains awake and forgetful during the shift. Has episodes of restlessness. Reorientation 
provided. Tolerating 2L NC with O2 sats up to 95%. No acute distress noted. Medsurg status. 
Bilateral lower extremity edema noted. Elevated legs on pillow. Right IJ permacath clean, 
dry and intact.  No s/s of bleeding during the night, no new skin breakdown noted. Safety 
precautions maintained, pt free from falls. Call light within reach. VSS, afebrile. Continue 
plan of care.

## 2021-07-07 NOTE — NUR
RECEIVED PT AWAKE, ALERT AND ORIENTEDX2. PT IN NO ACUTE RESPIRATORY DISTRESS. IV INTACT. 
SAFETY AND COMFORT PROVIDED. WILL CONTINUE TO  MONITOR.

## 2021-07-07 NOTE — NUR
received change of shift report, pt in bed resting. pt a/ox3 forgetful, left elbow abrasion, 
lower chelo leg pitting, pt on 2L O2 via NC, no signs of distress, no reports of pain. pt 
voiding via bedpan, pt has IV on the left Upper arm piccline, saline lock, and permacath on 
the right IJ. bed low and locked, call light within reach, will continue with plan of care.

## 2021-07-08 VITALS — DIASTOLIC BLOOD PRESSURE: 71 MMHG | SYSTOLIC BLOOD PRESSURE: 133 MMHG

## 2021-07-08 VITALS — SYSTOLIC BLOOD PRESSURE: 125 MMHG | DIASTOLIC BLOOD PRESSURE: 57 MMHG

## 2021-07-08 VITALS — SYSTOLIC BLOOD PRESSURE: 108 MMHG | DIASTOLIC BLOOD PRESSURE: 53 MMHG

## 2021-07-08 VITALS — SYSTOLIC BLOOD PRESSURE: 137 MMHG | DIASTOLIC BLOOD PRESSURE: 58 MMHG

## 2021-07-08 VITALS — DIASTOLIC BLOOD PRESSURE: 55 MMHG | SYSTOLIC BLOOD PRESSURE: 136 MMHG

## 2021-07-08 LAB
ALP SERPL-CCNC: 193 U/L (ref 50–136)
ALT SERPL W/O P-5'-P-CCNC: 152 U/L (ref 16–63)
AST SERPL-CCNC: 44 U/L (ref 15–37)
BILIRUB SERPL-MCNC: 2.8 MG/DL (ref 0.2–1)
BUN SERPL-MCNC: 48 MG/DL (ref 7–18)
CHLORIDE SERPL-SCNC: 107 MMOL/L (ref 98–107)
CO2 SERPL-SCNC: 25 MMOL/L (ref 21–32)
CREAT SERPL-MCNC: 3 MG/DL (ref 0.6–1.3)
GLUCOSE SERPL-MCNC: 123 MG/DL (ref 74–106)
HCT VFR BLD AUTO: 35.5 % (ref 36.7–47.1)
LIPASE SERPL-CCNC: 177 U/L (ref 73–393)
MAGNESIUM SERPL-MCNC: 2.4 MG/DL (ref 1.8–2.4)
MCH RBC QN AUTO: 23.7 UUG (ref 23.8–33.4)
MCV RBC AUTO: 78.1 FL (ref 73–96.2)
PHOSPHATE SERPL-MCNC: 4.8 MG/DL (ref 2.5–4.9)
PLATELET # BLD AUTO: 162 K/UL (ref 152–348)
POTASSIUM SERPL-SCNC: 4.2 MMOL/L (ref 3.5–5.1)
WS STN SPEC: 5.5 G/DL (ref 6.4–8.2)

## 2021-07-08 RX ADMIN — CARVEDILOL SCH MG: 3.12 TABLET, FILM COATED ORAL at 18:37

## 2021-07-08 RX ADMIN — DOXEPIN HYDROCHLORIDE SCH MG: 10 CAPSULE ORAL at 21:22

## 2021-07-08 RX ADMIN — ATORVASTATIN CALCIUM SCH MG: 40 TABLET, FILM COATED ORAL at 21:03

## 2021-07-08 RX ADMIN — CLOPIDOGREL BISULFATE SCH MG: 75 TABLET ORAL at 08:50

## 2021-07-08 RX ADMIN — POLYETHYLENE GLYCOL 3350 SCH GM: 17 POWDER, FOR SOLUTION ORAL at 08:50

## 2021-07-08 RX ADMIN — FUROSEMIDE SCH MG: 10 INJECTION, SOLUTION INTRAMUSCULAR; INTRAVENOUS at 08:50

## 2021-07-08 RX ADMIN — CARVEDILOL SCH MG: 3.12 TABLET, FILM COATED ORAL at 08:50

## 2021-07-08 RX ADMIN — ASPIRIN SCH MG: 81 TABLET, COATED ORAL at 08:49

## 2021-07-08 RX ADMIN — ESCITALOPRAM OXALATE SCH MG: 10 TABLET, FILM COATED ORAL at 08:50

## 2021-07-08 RX ADMIN — FAMOTIDINE SCH MG: 20 TABLET, FILM COATED ORAL at 08:50

## 2021-07-08 NOTE — NUR
PT IN BED RESTING, FAMILY AT BEDSIDE. PT A/OX3, CALL LIGHT WITHIN REACH, IV ACCESS PATENT 
AND INTACT, PORTACATH IN TACT, NO BLEEDING. PT HAD BM AND ATE ALL FOOD, GOOD APPETITE. BED 
LOW AND LOCKED. ALL MEDICATIONS GIVEN AS ORDERED, ALL NEEDS MET THIS SHIFT WILL ENDORSE TO 
ONCOMING NURSE.

## 2021-07-08 NOTE — NUR
PT SLEPT INTERMITTENTLY. PT IN NO ACUTE DISTRESS. PT HAVE EPISODES OF FORGETFULNESS.  NEEDS 
REORIENTATION. TRYING TO GET OUT OF THE BED. PRN KLONOPIN GIVEN AT 2138H. PT TOLERATED IT 
WELL. PRESCRIBED MEDICATION GIVEN AND PT TOLERATED IT WELL.  PT ON 2L NASAL CANNULA.  IV 
INTACT. SAFETY AND COMFORT PROVIDED. WILL CONTINUE TO MONITOR.

## 2021-07-08 NOTE — NUR
received change of shift report. pt NPO for US of liver. pt a/ox3 on 2L O2 NC, saturating 
at 99%. pt voiding via diaper, right IJ dialysis catheter, left Upper arm PICC line. pt 
sleeping in bed, easily arousable will continue to monitor.

## 2021-07-09 VITALS — SYSTOLIC BLOOD PRESSURE: 118 MMHG | DIASTOLIC BLOOD PRESSURE: 60 MMHG

## 2021-07-09 VITALS — SYSTOLIC BLOOD PRESSURE: 93 MMHG | DIASTOLIC BLOOD PRESSURE: 53 MMHG

## 2021-07-09 VITALS — DIASTOLIC BLOOD PRESSURE: 60 MMHG | SYSTOLIC BLOOD PRESSURE: 106 MMHG

## 2021-07-09 LAB
BUN SERPL-MCNC: 46 MG/DL (ref 7–18)
CHLORIDE SERPL-SCNC: 105 MMOL/L (ref 98–107)
CO2 SERPL-SCNC: 25 MMOL/L (ref 21–32)
CREAT SERPL-MCNC: 2.8 MG/DL (ref 0.6–1.3)
GLUCOSE SERPL-MCNC: 122 MG/DL (ref 74–106)
HCT VFR BLD AUTO: 35.9 % (ref 36.7–47.1)
MCH RBC QN AUTO: 23.5 UUG (ref 23.8–33.4)
MCV RBC AUTO: 79 FL (ref 73–96.2)
PLATELET # BLD AUTO: 188 K/UL (ref 152–348)
POTASSIUM SERPL-SCNC: 4.2 MMOL/L (ref 3.5–5.1)

## 2021-07-09 RX ADMIN — SODIUM CHLORIDE PRN MG: 9 INJECTION, SOLUTION INTRAVENOUS at 22:16

## 2021-07-09 RX ADMIN — FAMOTIDINE SCH MG: 20 TABLET, FILM COATED ORAL at 08:41

## 2021-07-09 RX ADMIN — CARVEDILOL SCH MG: 3.12 TABLET, FILM COATED ORAL at 18:00

## 2021-07-09 RX ADMIN — ASPIRIN SCH MG: 81 TABLET, COATED ORAL at 08:41

## 2021-07-09 RX ADMIN — ESCITALOPRAM OXALATE SCH MG: 10 TABLET, FILM COATED ORAL at 08:42

## 2021-07-09 RX ADMIN — POLYETHYLENE GLYCOL 3350 SCH GM: 17 POWDER, FOR SOLUTION ORAL at 08:42

## 2021-07-09 RX ADMIN — FUROSEMIDE SCH MG: 10 INJECTION, SOLUTION INTRAMUSCULAR; INTRAVENOUS at 08:42

## 2021-07-09 RX ADMIN — CARVEDILOL SCH MG: 3.12 TABLET, FILM COATED ORAL at 08:00

## 2021-07-09 RX ADMIN — CLOPIDOGREL BISULFATE SCH MG: 75 TABLET ORAL at 08:41

## 2021-07-09 RX ADMIN — CLONAZEPAM PRN MG: 1 TABLET ORAL at 21:27

## 2021-07-09 RX ADMIN — DOXEPIN HYDROCHLORIDE SCH MG: 10 CAPSULE ORAL at 20:33

## 2021-07-09 RX ADMIN — ATORVASTATIN CALCIUM SCH MG: 40 TABLET, FILM COATED ORAL at 20:33

## 2021-07-09 NOTE — NUR
RECEIVED CHANGE OF SHIFT REPORT. PT A/OX3, SOMETIMES FORGETFUL, PT IN BED RESTING, PT ON 2L 
O2 VIA NC, NO SIGNS OF DISTRESS, NO REPORTS OF PAIN AT THIS TIME. PT VOIDING VIA DIAPER, IV 
ON THE LEFT UA PICC LINE AND PERMACATH ON THE RIGHT IJ, NO SIGNS OF BLEEDING, INTACT. CALL 
LIGHT WITHIN REACH, WILL CONTINUE WITH PLAN OF CARE.

## 2021-07-09 NOTE — NUR
PT A/OX3, SOMETIMES FORGETFUL, PT IN BED RESTING, ALL MEDICATIONS GIVEN AS ORDERED, ALL 
NEEDS MET THIS SHIFT, FAMILY AT BEDSIDE. PT ON 2L O2 VIA NC, NO SIGNS OF DISTRESS, NO 
REPORTS OF PAIN AT THIS TIME. PT VOIDING VIA DIAPER, IV ON THE LEFT UA PICC LINE AND 
PERMACATH ON THE RIGHT IJ, NO SIGNS OF BLEEDING, INTACT. CALL LIGHT WITHIN REACH, WILL 
ENDORSE TO ONCOMING NURSE.

## 2021-07-09 NOTE — NUR
Received pt resting in bed. AAO x3, forgetful. On 2L O2 via NC, no acute distress noted. 
Denies pain/ discomfort. Due meds given as ordered. Safety measures maintained. Encouraged 
to use call light. Personal items within reach. Will continue to monitor.

## 2021-07-09 NOTE — NUR
Pt felt nauseous, no emesis. Notified Dr. Coffman with new order for Zofran IV 4mg Q6H PRN. 
Carried out order with relief. Will continue to monitor.

## 2021-07-10 VITALS — DIASTOLIC BLOOD PRESSURE: 64 MMHG | SYSTOLIC BLOOD PRESSURE: 109 MMHG

## 2021-07-10 VITALS — DIASTOLIC BLOOD PRESSURE: 61 MMHG | SYSTOLIC BLOOD PRESSURE: 108 MMHG

## 2021-07-10 VITALS — DIASTOLIC BLOOD PRESSURE: 53 MMHG | SYSTOLIC BLOOD PRESSURE: 102 MMHG

## 2021-07-10 VITALS — SYSTOLIC BLOOD PRESSURE: 117 MMHG | DIASTOLIC BLOOD PRESSURE: 56 MMHG

## 2021-07-10 VITALS — DIASTOLIC BLOOD PRESSURE: 63 MMHG | SYSTOLIC BLOOD PRESSURE: 112 MMHG

## 2021-07-10 VITALS — DIASTOLIC BLOOD PRESSURE: 66 MMHG | SYSTOLIC BLOOD PRESSURE: 106 MMHG

## 2021-07-10 LAB
BASE EXCESS BLDA CALC-SCNC: -5 MMOL/L
BUN SERPL-MCNC: 47 MG/DL (ref 7–18)
CHLORIDE SERPL-SCNC: 102 MMOL/L (ref 98–107)
CO2 SERPL-SCNC: 27 MMOL/L (ref 21–32)
CREAT SERPL-MCNC: 2.9 MG/DL (ref 0.6–1.3)
GLUCOSE SERPL-MCNC: 131 MG/DL (ref 74–106)
HCO3 BLDA-SCNC: 20.2 MMOL/L
HCT VFR BLD AUTO: 37.9 % (ref 36.7–47.1)
HGB BLDA OXIMETRY-MCNC: 12.3 G/DL (ref 13.5–18)
INHALED O2 CONCENTRATION: 32 %
INHALED O2 FLOW RATE: 3 L/MIN (ref 0–30)
LYMPHOCYTES NFR BLD MANUAL: 5 % (ref 20–40)
MAGNESIUM SERPL-MCNC: 2.4 MG/DL (ref 1.8–2.4)
MCH RBC QN AUTO: 23.1 UUG (ref 23.8–33.4)
MCV RBC AUTO: 78.2 FL (ref 73–96.2)
MONOCYTES NFR BLD MANUAL: 2 % (ref 2–10)
NEUTS SEG NFR BLD MANUAL: 93 % (ref 42–75)
PCO2 TEMP ADJ BLDA: 38.1 MMHG (ref 35–45)
PH TEMP ADJ BLDA: 7.34 [PH] (ref 7.35–7.45)
PHOSPHATE SERPL-MCNC: 3.9 MG/DL (ref 2.5–4.9)
PLATELET # BLD AUTO: 214 K/UL (ref 152–348)
PO2 TEMP ADJ BLDA: 101.7 MMHG (ref 75–100)
POTASSIUM SERPL-SCNC: 3.9 MMOL/L (ref 3.5–5.1)
SPECIMEN DRAWN FROM PATIENT: (no result)

## 2021-07-10 RX ADMIN — ANORECTAL OINTMENT SCH GM: 15.7; .44; 24; 20.6 OINTMENT TOPICAL at 09:09

## 2021-07-10 RX ADMIN — FUROSEMIDE SCH MG: 10 INJECTION, SOLUTION INTRAMUSCULAR; INTRAVENOUS at 08:18

## 2021-07-10 RX ADMIN — FAMOTIDINE SCH MG: 20 TABLET, FILM COATED ORAL at 08:18

## 2021-07-10 RX ADMIN — POLYETHYLENE GLYCOL 3350 SCH GM: 17 POWDER, FOR SOLUTION ORAL at 08:19

## 2021-07-10 RX ADMIN — ATORVASTATIN CALCIUM SCH MG: 40 TABLET, FILM COATED ORAL at 20:49

## 2021-07-10 RX ADMIN — CARVEDILOL SCH MG: 3.12 TABLET, FILM COATED ORAL at 08:19

## 2021-07-10 RX ADMIN — CLONAZEPAM PRN MG: 1 TABLET ORAL at 21:21

## 2021-07-10 RX ADMIN — ASPIRIN SCH MG: 81 TABLET, COATED ORAL at 08:18

## 2021-07-10 RX ADMIN — CLOPIDOGREL BISULFATE SCH MG: 75 TABLET ORAL at 08:18

## 2021-07-10 RX ADMIN — DOXEPIN HYDROCHLORIDE SCH MG: 10 CAPSULE ORAL at 20:50

## 2021-07-10 RX ADMIN — SODIUM CHLORIDE PRN MG: 9 INJECTION, SOLUTION INTRAVENOUS at 11:08

## 2021-07-10 RX ADMIN — ESCITALOPRAM OXALATE SCH MG: 10 TABLET, FILM COATED ORAL at 08:18

## 2021-07-10 RX ADMIN — ANORECTAL OINTMENT SCH GM: 15.7; .44; 24; 20.6 OINTMENT TOPICAL at 21:00

## 2021-07-10 RX ADMIN — CARVEDILOL SCH MG: 3.12 TABLET, FILM COATED ORAL at 17:04

## 2021-07-10 RX ADMIN — Medication SCH ML: at 08:19

## 2021-07-10 RX ADMIN — SODIUM CHLORIDE PRN MG: 9 INJECTION, SOLUTION INTRAVENOUS at 18:45

## 2021-07-10 NOTE — NUR
abg result relayed to the physician with no further orders, saturation monitored and stable 
at 100 % on 2 liters nasal cannula.awaiting for the chest xray result,dozing off 
intermittently

## 2021-07-10 NOTE — NUR
ekg done . stat abg ordered, Rt is informed 

-------------------------------------------------------------------------------

Addendum: 07/10/21 at 2150 by REGISTRY TERRY STEWART

-------------------------------------------------------------------------------

zguard not available, requested from the pharmacy.

## 2021-07-10 NOTE — NUR
Dr Hollis informed that the patient is anxious ,vital signs checked and clonopin dose given , 
but patient is short of breath and very anxious, Rt paged to assess the patient  as the 
patient is short of breath. with orders and carried out.,

## 2021-07-10 NOTE — NUR
patient with complaints of dizziness and claims he feels like he will pass out,repositioned  
and kept comfortable. was givemn 

-------------------------------------------------------------------------------

Addendum: 07/10/21 at 2033 by REGISTRY TERRY RN

-------------------------------------------------------------------------------

zofran dose given at 1830 hours for nausea, claims he currently is nauseous and vomited a 
small amount of clear vomitus,vital signs checked , sat 100 % of 2l nasal cannula

## 2021-07-10 NOTE — NUR
RESTING IN BED WITH NO SS OF PAIN, N/V.  RIGHT IJ DIALYSIS CATHETER INTACT WITH NO SIGNS OF 
BLEEDING WILL CONTINUE TO OBSERVE

## 2021-07-11 VITALS — DIASTOLIC BLOOD PRESSURE: 67 MMHG | SYSTOLIC BLOOD PRESSURE: 117 MMHG

## 2021-07-11 VITALS — SYSTOLIC BLOOD PRESSURE: 107 MMHG | DIASTOLIC BLOOD PRESSURE: 72 MMHG

## 2021-07-11 VITALS — SYSTOLIC BLOOD PRESSURE: 121 MMHG | DIASTOLIC BLOOD PRESSURE: 69 MMHG

## 2021-07-11 VITALS — DIASTOLIC BLOOD PRESSURE: 60 MMHG | SYSTOLIC BLOOD PRESSURE: 117 MMHG

## 2021-07-11 VITALS — SYSTOLIC BLOOD PRESSURE: 108 MMHG | DIASTOLIC BLOOD PRESSURE: 60 MMHG

## 2021-07-11 LAB
BUN SERPL-MCNC: 52 MG/DL (ref 7–18)
CHLORIDE SERPL-SCNC: 103 MMOL/L (ref 98–107)
CO2 SERPL-SCNC: 26 MMOL/L (ref 21–32)
CREAT SERPL-MCNC: 3.1 MG/DL (ref 0.6–1.3)
GLUCOSE SERPL-MCNC: 108 MG/DL (ref 74–106)
HCT VFR BLD AUTO: 35.2 % (ref 36.7–47.1)
MAGNESIUM SERPL-MCNC: 2.7 MG/DL (ref 1.8–2.4)
MCH RBC QN AUTO: 23.8 UUG (ref 23.8–33.4)
MCV RBC AUTO: 78.6 FL (ref 73–96.2)
PHOSPHATE SERPL-MCNC: 4.9 MG/DL (ref 2.5–4.9)
PLATELET # BLD AUTO: 199 K/UL (ref 152–348)
POTASSIUM SERPL-SCNC: 4.4 MMOL/L (ref 3.5–5.1)

## 2021-07-11 RX ADMIN — ESCITALOPRAM OXALATE SCH MG: 10 TABLET, FILM COATED ORAL at 08:00

## 2021-07-11 RX ADMIN — ATORVASTATIN CALCIUM SCH MG: 40 TABLET, FILM COATED ORAL at 20:03

## 2021-07-11 RX ADMIN — ANORECTAL OINTMENT SCH GM: 15.7; .44; 24; 20.6 OINTMENT TOPICAL at 08:01

## 2021-07-11 RX ADMIN — ASPIRIN SCH MG: 81 TABLET, COATED ORAL at 08:00

## 2021-07-11 RX ADMIN — Medication SCH ML: at 08:21

## 2021-07-11 RX ADMIN — ANORECTAL OINTMENT SCH GM: 15.7; .44; 24; 20.6 OINTMENT TOPICAL at 20:07

## 2021-07-11 RX ADMIN — CLOPIDOGREL BISULFATE SCH MG: 75 TABLET ORAL at 08:00

## 2021-07-11 RX ADMIN — POLYETHYLENE GLYCOL 3350 SCH GM: 17 POWDER, FOR SOLUTION ORAL at 08:01

## 2021-07-11 RX ADMIN — FAMOTIDINE SCH MG: 20 TABLET, FILM COATED ORAL at 08:00

## 2021-07-11 RX ADMIN — DOXEPIN HYDROCHLORIDE SCH MG: 10 CAPSULE ORAL at 20:03

## 2021-07-11 RX ADMIN — CARVEDILOL SCH MG: 3.12 TABLET, FILM COATED ORAL at 18:00

## 2021-07-11 RX ADMIN — FUROSEMIDE SCH MG: 10 INJECTION, SOLUTION INTRAMUSCULAR; INTRAVENOUS at 08:00

## 2021-07-11 RX ADMIN — CARVEDILOL SCH MG: 3.12 TABLET, FILM COATED ORAL at 07:48

## 2021-07-11 NOTE — NUR
PT SLEPT INTERMITTENTLY. PT IN NO ACUTE DISTRESS. IV INTACT.  SAFETY AND COMFORT PROVIDED. 
WILL CONTINUE TO MONITOR. 

-------------------------------------------------------------------------------

Addendum: 07/12/21 at 0622 by CINTIA GARCÍA RN

-------------------------------------------------------------------------------

WRONG PT

## 2021-07-11 NOTE — NUR
Dr Forde informed about the chest xray result and the change in the wbc to 19.1. with no 
further orders made

## 2021-07-11 NOTE — NUR
RECEIVED PT AWAKE, ALERT AND ORIENTEDX3.PT IN NO ACUTE DISTRESS. SAFETY AND COMFORT 
PROVIDED.WILL CONTINUE TO MONITOR.

## 2021-07-12 VITALS — SYSTOLIC BLOOD PRESSURE: 118 MMHG | DIASTOLIC BLOOD PRESSURE: 63 MMHG

## 2021-07-12 VITALS — DIASTOLIC BLOOD PRESSURE: 63 MMHG | SYSTOLIC BLOOD PRESSURE: 119 MMHG

## 2021-07-12 VITALS — SYSTOLIC BLOOD PRESSURE: 113 MMHG | DIASTOLIC BLOOD PRESSURE: 69 MMHG

## 2021-07-12 VITALS — SYSTOLIC BLOOD PRESSURE: 120 MMHG | DIASTOLIC BLOOD PRESSURE: 45 MMHG

## 2021-07-12 VITALS — DIASTOLIC BLOOD PRESSURE: 54 MMHG | SYSTOLIC BLOOD PRESSURE: 99 MMHG

## 2021-07-12 LAB
BUN SERPL-MCNC: 49 MG/DL (ref 7–18)
CHLORIDE SERPL-SCNC: 104 MMOL/L (ref 98–107)
CO2 SERPL-SCNC: 28 MMOL/L (ref 21–32)
CREAT SERPL-MCNC: 2.9 MG/DL (ref 0.6–1.3)
EOSINOPHIL NFR BLD MANUAL: 1 % (ref 0–8)
GLUCOSE SERPL-MCNC: 100 MG/DL (ref 74–106)
HCT VFR BLD AUTO: 35.2 % (ref 36.7–47.1)
LYMPHOCYTES NFR BLD MANUAL: 6 % (ref 20–40)
MAGNESIUM SERPL-MCNC: 2.3 MG/DL (ref 1.8–2.4)
MCH RBC QN AUTO: 23.3 UUG (ref 23.8–33.4)
MCV RBC AUTO: 79.2 FL (ref 73–96.2)
MONOCYTES NFR BLD MANUAL: 8 % (ref 2–10)
NEUTS SEG NFR BLD MANUAL: 85 % (ref 42–75)
PHOSPHATE SERPL-MCNC: 3.9 MG/DL (ref 2.5–4.9)
PLATELET # BLD AUTO: 196 K/UL (ref 152–348)
POTASSIUM SERPL-SCNC: 3.9 MMOL/L (ref 3.5–5.1)

## 2021-07-12 RX ADMIN — FUROSEMIDE SCH MG: 10 INJECTION, SOLUTION INTRAMUSCULAR; INTRAVENOUS at 16:59

## 2021-07-12 RX ADMIN — ANORECTAL OINTMENT SCH GM: 15.7; .44; 24; 20.6 OINTMENT TOPICAL at 20:11

## 2021-07-12 RX ADMIN — ASPIRIN SCH MG: 81 TABLET, COATED ORAL at 09:07

## 2021-07-12 RX ADMIN — FUROSEMIDE SCH MG: 10 INJECTION, SOLUTION INTRAMUSCULAR; INTRAVENOUS at 09:08

## 2021-07-12 RX ADMIN — DOXEPIN HYDROCHLORIDE SCH MG: 10 CAPSULE ORAL at 20:10

## 2021-07-12 RX ADMIN — CLONAZEPAM PRN MG: 1 TABLET ORAL at 21:39

## 2021-07-12 RX ADMIN — CARVEDILOL SCH MG: 3.12 TABLET, FILM COATED ORAL at 17:00

## 2021-07-12 RX ADMIN — CLOPIDOGREL BISULFATE SCH MG: 75 TABLET ORAL at 09:07

## 2021-07-12 RX ADMIN — ESCITALOPRAM OXALATE SCH MG: 10 TABLET, FILM COATED ORAL at 09:08

## 2021-07-12 RX ADMIN — POLYETHYLENE GLYCOL 3350 SCH GM: 17 POWDER, FOR SOLUTION ORAL at 09:00

## 2021-07-12 RX ADMIN — FAMOTIDINE SCH MG: 20 TABLET, FILM COATED ORAL at 09:08

## 2021-07-12 RX ADMIN — Medication SCH ML: at 09:22

## 2021-07-12 RX ADMIN — CARVEDILOL SCH MG: 3.12 TABLET, FILM COATED ORAL at 09:07

## 2021-07-12 RX ADMIN — ATORVASTATIN CALCIUM SCH MG: 40 TABLET, FILM COATED ORAL at 20:10

## 2021-07-12 RX ADMIN — ANORECTAL OINTMENT SCH GM: 15.7; .44; 24; 20.6 OINTMENT TOPICAL at 09:22

## 2021-07-12 NOTE — NUR
DR CARMELITA STONE HERE AND AWARE THAT PATIENTS DAUGHTER RAMEZ IS WAITING FOR HIS CALL RE NEXT 
PLAN OF CARE GARRISON PHONE NUMBER GIVEN TO HIM AND HE STATED WILL CALL HER

## 2021-07-12 NOTE — NUR
DR GARCIA [facility] HERE AND SEEN PATIENT AND SPOKE WITH PATIENTS WIFE AND NOTIFIED 
HER THAT PATIENT MAY NEED ANOTHER ROUND OF DIALYSIS BEFORE BEING DISCHARGED AND THE PATIENTS 
WIFE STATED THAT THE  HAD TOLD HER THAT SHE WAS STILL WAITING FOR THE 
AUTHORIZATION FROM THE PATIENTS INSURANCE IN TERMS OF PLACEMENT.

## 2021-07-12 NOTE — NUR
PT SLEPT INTERMITTENTLY.PT IN NO ACUTE DISTRESS. PRESCRIBED MEDICATION GIVEN AND PT 
TOLERATED IT WELL . SAFETY AND COMFORT PROVIDED.WILL ENDORSE TO INCOMING NURSE FOR 
CONTINUITY OF CARE.

## 2021-07-12 NOTE — NUR
RECEIVED PT AWAKE, ALERT AND ORIENTEDX3. PT IN NO ACUTE DISTRESS. IV INTACT. IJ INTACT. PT 
ON 2L NASAL CANNULA. SAFETY AND COMFORT PROVIDED. WILL CONTINUE TO MONITOR.

## 2021-07-12 NOTE — NUR
RECEIVED PATIENT IN BED ALERT TO SELF ON ROOM AIR WITH NO SOB AT THIS TIME HE HAS AMID LINE 
WITH IS PATENT AND INTACT ALL NEEDS ANTICIPATED AND SATISFIED CALL LIGHTS AND PERSONAL 
BELONGINGS ARE WITHIN EASY REACH AT THIS TIME WILL CONTINUE TO OBSERVE.

## 2021-07-13 VITALS — DIASTOLIC BLOOD PRESSURE: 52 MMHG | SYSTOLIC BLOOD PRESSURE: 99 MMHG

## 2021-07-13 VITALS — DIASTOLIC BLOOD PRESSURE: 63 MMHG | SYSTOLIC BLOOD PRESSURE: 111 MMHG

## 2021-07-13 VITALS — DIASTOLIC BLOOD PRESSURE: 73 MMHG | SYSTOLIC BLOOD PRESSURE: 121 MMHG

## 2021-07-13 VITALS — DIASTOLIC BLOOD PRESSURE: 62 MMHG | SYSTOLIC BLOOD PRESSURE: 117 MMHG

## 2021-07-13 LAB
HBV SURFACE AB SER QL: NON REACTIVE
HBV SURFACE AG SERPL QL IA: NEGATIVE

## 2021-07-13 RX ADMIN — SODIUM CHLORIDE PRN MG: 9 INJECTION, SOLUTION INTRAVENOUS at 05:52

## 2021-07-13 RX ADMIN — Medication SCH ML: at 08:25

## 2021-07-13 RX ADMIN — POLYETHYLENE GLYCOL 3350 SCH GM: 17 POWDER, FOR SOLUTION ORAL at 08:25

## 2021-07-13 RX ADMIN — ASPIRIN SCH MG: 81 TABLET, COATED ORAL at 08:24

## 2021-07-13 RX ADMIN — CARVEDILOL SCH MG: 3.12 TABLET, FILM COATED ORAL at 17:45

## 2021-07-13 RX ADMIN — CLOPIDOGREL BISULFATE SCH MG: 75 TABLET ORAL at 08:24

## 2021-07-13 RX ADMIN — FAMOTIDINE SCH MG: 20 TABLET, FILM COATED ORAL at 08:24

## 2021-07-13 RX ADMIN — ANORECTAL OINTMENT SCH GM: 15.7; .44; 24; 20.6 OINTMENT TOPICAL at 08:27

## 2021-07-13 RX ADMIN — SODIUM CHLORIDE PRN MG: 9 INJECTION, SOLUTION INTRAVENOUS at 17:44

## 2021-07-13 RX ADMIN — CARVEDILOL SCH MG: 3.12 TABLET, FILM COATED ORAL at 08:00

## 2021-07-13 RX ADMIN — ESCITALOPRAM OXALATE SCH MG: 10 TABLET, FILM COATED ORAL at 08:24

## 2021-07-13 RX ADMIN — FUROSEMIDE SCH MG: 10 INJECTION, SOLUTION INTRAMUSCULAR; INTRAVENOUS at 08:27

## 2021-07-13 NOTE — NUR
pt has an order for discharge. received call from [facility]RIS saying [facility] and transportation will pick him up @1930. I informed wife and daughter who were at 
bedside. (R) IJ jinny cath was removed by Dr. Coffman earlier today. dressing dry, no 
bleeding. midline removed as per Dr. Forde's orders. report given to Rayna from [facility]. discharge packet done, pictures taken. report given to night shift TERRY Napier 
regarding discharge and transportation .

## 2021-07-13 NOTE — NUR
PT SLEPT INTERMITTENTLY. PT IN NO ACUTE DISTRESS. IV INTACT. PRESCRIBED MEDICATION GIVEN AND 
PT TOLERATED IT WELL. ZOFRAN PRN GIVEN FOR NAUSEA. PT TOLERATED IT WELL.  PT TURNED AND 
REPOSITIONED.SAFETY AND COMFORT PROVIDED. WILL ENDORSE TO INCOMING NURSE FOR CONTINUITY OF 
CARE.

## 2021-07-14 ENCOUNTER — HOSPITAL ENCOUNTER (INPATIENT)
Dept: HOSPITAL 54 - ER | Age: 81
LOS: 13 days | Discharge: NURSING, REHAB OR LONG-TERM CARE | DRG: 871 | End: 2021-07-27
Attending: INTERNAL MEDICINE | Admitting: NURSE PRACTITIONER
Payer: MEDICARE

## 2021-07-14 VITALS — BODY MASS INDEX: 28.04 KG/M2 | WEIGHT: 185.04 LBS | HEIGHT: 68 IN

## 2021-07-14 DIAGNOSIS — Z95.810: ICD-10-CM

## 2021-07-14 DIAGNOSIS — J98.11: ICD-10-CM

## 2021-07-14 DIAGNOSIS — I80.9: ICD-10-CM

## 2021-07-14 DIAGNOSIS — K44.9: ICD-10-CM

## 2021-07-14 DIAGNOSIS — G93.89: ICD-10-CM

## 2021-07-14 DIAGNOSIS — K59.00: ICD-10-CM

## 2021-07-14 DIAGNOSIS — J18.9: ICD-10-CM

## 2021-07-14 DIAGNOSIS — Z16.12: ICD-10-CM

## 2021-07-14 DIAGNOSIS — I50.43: ICD-10-CM

## 2021-07-14 DIAGNOSIS — N17.0: ICD-10-CM

## 2021-07-14 DIAGNOSIS — I13.0: ICD-10-CM

## 2021-07-14 DIAGNOSIS — Z82.3: ICD-10-CM

## 2021-07-14 DIAGNOSIS — K81.0: ICD-10-CM

## 2021-07-14 DIAGNOSIS — Z98.890: ICD-10-CM

## 2021-07-14 DIAGNOSIS — I21.A1: ICD-10-CM

## 2021-07-14 DIAGNOSIS — N28.1: ICD-10-CM

## 2021-07-14 DIAGNOSIS — F05: ICD-10-CM

## 2021-07-14 DIAGNOSIS — E87.2: ICD-10-CM

## 2021-07-14 DIAGNOSIS — Z87.820: ICD-10-CM

## 2021-07-14 DIAGNOSIS — I25.5: ICD-10-CM

## 2021-07-14 DIAGNOSIS — I70.0: ICD-10-CM

## 2021-07-14 DIAGNOSIS — F32.9: ICD-10-CM

## 2021-07-14 DIAGNOSIS — A41.9: Primary | ICD-10-CM

## 2021-07-14 DIAGNOSIS — E44.1: ICD-10-CM

## 2021-07-14 DIAGNOSIS — I27.20: ICD-10-CM

## 2021-07-14 DIAGNOSIS — E87.5: ICD-10-CM

## 2021-07-14 DIAGNOSIS — R18.8: ICD-10-CM

## 2021-07-14 DIAGNOSIS — K57.30: ICD-10-CM

## 2021-07-14 DIAGNOSIS — N18.9: ICD-10-CM

## 2021-07-14 DIAGNOSIS — Z20.822: ICD-10-CM

## 2021-07-14 DIAGNOSIS — I69.354: ICD-10-CM

## 2021-07-14 DIAGNOSIS — J96.01: ICD-10-CM

## 2021-07-14 DIAGNOSIS — I82.622: ICD-10-CM

## 2021-07-14 DIAGNOSIS — I25.10: ICD-10-CM

## 2021-07-14 DIAGNOSIS — R65.20: ICD-10-CM

## 2021-07-14 DIAGNOSIS — K76.9: ICD-10-CM

## 2021-07-14 DIAGNOSIS — R74.01: ICD-10-CM

## 2021-07-14 DIAGNOSIS — Z79.82: ICD-10-CM

## 2021-07-14 DIAGNOSIS — J90: ICD-10-CM

## 2021-07-14 DIAGNOSIS — Z79.899: ICD-10-CM

## 2021-07-14 DIAGNOSIS — N39.0: ICD-10-CM

## 2021-07-14 DIAGNOSIS — B96.20: ICD-10-CM

## 2021-07-14 DIAGNOSIS — D69.6: ICD-10-CM

## 2021-07-14 DIAGNOSIS — I67.2: ICD-10-CM

## 2021-07-14 LAB
ALBUMIN SERPL BCP-MCNC: 3.2 G/DL (ref 3.4–5)
ALP SERPL-CCNC: 167 U/L (ref 46–116)
ALT SERPL W P-5'-P-CCNC: 151 U/L (ref 12–78)
AST SERPL W P-5'-P-CCNC: 140 U/L (ref 15–37)
BASOPHILS # BLD AUTO: 0 K/UL (ref 0–0.2)
BASOPHILS NFR BLD AUTO: 0.3 % (ref 0–2)
BILIRUB DIRECT SERPL-MCNC: 3.4 MG/DL (ref 0–0.2)
BILIRUB SERPL-MCNC: 4.5 MG/DL (ref 0.2–1)
BILIRUB UR QL STRIP: (no result)
BUN SERPL-MCNC: 54 MG/DL (ref 7–18)
CALCIUM SERPL-MCNC: 8.5 MG/DL (ref 8.5–10.1)
CHLORIDE SERPL-SCNC: 99 MMOL/L (ref 98–107)
CO2 SERPL-SCNC: 15 MMOL/L (ref 21–32)
COLOR UR: YELLOW
CREAT SERPL-MCNC: 3.5 MG/DL (ref 0.6–1.3)
DEPRECATED SQUAMOUS URNS QL MICRO: (no result) /HPF
EOSINOPHIL NFR BLD AUTO: 0 % (ref 0–6)
GLUCOSE SERPL-MCNC: 131 MG/DL (ref 74–106)
HCT VFR BLD AUTO: 42 % (ref 39–51)
HGB BLD-MCNC: 12.1 G/DL (ref 13.5–17.5)
LIPASE SERPL-CCNC: 163 U/L (ref 73–393)
LYMPHOCYTES NFR BLD AUTO: 0.4 K/UL (ref 0.8–4.8)
LYMPHOCYTES NFR BLD AUTO: 2.1 % (ref 20–44)
MCHC RBC AUTO-ENTMCNC: 29 G/DL (ref 31–36)
MCV RBC AUTO: 85 FL (ref 80–96)
MONOCYTES NFR BLD AUTO: 1 K/UL (ref 0.1–1.3)
MONOCYTES NFR BLD AUTO: 5.3 % (ref 2–12)
NEUTROPHILS # BLD AUTO: 17.8 K/UL (ref 1.8–8.9)
NEUTROPHILS NFR BLD AUTO: 92.3 % (ref 43–81)
PH UR STRIP: 5.5 [PH] (ref 5–8)
PLATELET # BLD AUTO: 244 K/UL (ref 150–450)
POTASSIUM SERPL-SCNC: 5.4 MMOL/L (ref 3.5–5.1)
PROT SERPL-MCNC: 6.1 G/DL (ref 6.4–8.2)
PROT UR QL STRIP: 100 MG/DL
RBC # BLD AUTO: 4.98 MIL/UL (ref 4.5–6)
SODIUM SERPL-SCNC: 135 MMOL/L (ref 136–145)
UROBILINOGEN UR STRIP-MCNC: 2 EU/DL
WBC NRBC COR # BLD AUTO: 19.3 K/UL (ref 4.3–11)

## 2021-07-14 PROCEDURE — A6253 ABSORPT DRG > 48 SQ IN W/O B: HCPCS

## 2021-07-14 PROCEDURE — A6403 STERILE GAUZE>16 <= 48 SQ IN: HCPCS

## 2021-07-14 PROCEDURE — A9537 TC99M MEBROFENIN: HCPCS

## 2021-07-14 PROCEDURE — G0378 HOSPITAL OBSERVATION PER HR: HCPCS

## 2021-07-14 PROCEDURE — A4217 STERILE WATER/SALINE, 500 ML: HCPCS

## 2021-07-14 PROCEDURE — U0003 INFECTIOUS AGENT DETECTION BY NUCLEIC ACID (DNA OR RNA); SEVERE ACUTE RESPIRATORY SYNDROME CORONAVIRUS 2 (SARS-COV-2) (CORONAVIRUS DISEASE [COVID-19]), AMPLIFIED PROBE TECHNIQUE, MAKING USE OF HIGH THROUGHPUT TECHNOLOGIES AS DESCRIBED BY CMS-2020-01-R: HCPCS

## 2021-07-14 PROCEDURE — C1769 GUIDE WIRE: HCPCS

## 2021-07-14 PROCEDURE — A4216 STERILE WATER/SALINE, 10 ML: HCPCS

## 2021-07-14 PROCEDURE — P9047 ALBUMIN (HUMAN), 25%, 50ML: HCPCS

## 2021-07-14 PROCEDURE — C1750 CATH, HEMODIALYSIS,LONG-TERM: HCPCS

## 2021-07-14 PROCEDURE — C9803 HOPD COVID-19 SPEC COLLECT: HCPCS

## 2021-07-14 RX ADMIN — SODIUM CHLORIDE ONE ML: 9 INJECTION, SOLUTION INTRAVENOUS at 18:29

## 2021-07-14 NOTE — NUR
SUNIL MAYEN FROM [facility]ARE [facility], C/O ABDOMINAL PAIN SINCE THIS MORNING. PATIENT 
A/OX3, BREATHING EVEN AND UNLABORED, NO SOB NOTED. NEEDS ATTENDED.

## 2021-07-14 NOTE — NUR
RECEIVED ORDER FROM DR. ISIDRO TO GIVE 1L OF NS. AND FENTANYL 50MCG X1 FOR 
ABDOMINAL PAIN. PATIENT IS ON O2 AT 4LPM VIA NC WITH SPO2 OF 86%, INCREASED TO 
10LPM VIA FACE MASK WITH SPO2 OF 92%.

## 2021-07-15 VITALS — SYSTOLIC BLOOD PRESSURE: 110 MMHG | DIASTOLIC BLOOD PRESSURE: 70 MMHG

## 2021-07-15 VITALS — DIASTOLIC BLOOD PRESSURE: 61 MMHG | SYSTOLIC BLOOD PRESSURE: 108 MMHG

## 2021-07-15 VITALS — DIASTOLIC BLOOD PRESSURE: 70 MMHG | SYSTOLIC BLOOD PRESSURE: 117 MMHG

## 2021-07-15 VITALS — DIASTOLIC BLOOD PRESSURE: 68 MMHG | SYSTOLIC BLOOD PRESSURE: 118 MMHG

## 2021-07-15 VITALS — DIASTOLIC BLOOD PRESSURE: 75 MMHG | SYSTOLIC BLOOD PRESSURE: 123 MMHG

## 2021-07-15 LAB
BASE EXCESS BLDA CALC-SCNC: -11 MMOL/L
BASOPHILS # BLD AUTO: 0.1 K/UL (ref 0–0.2)
BASOPHILS NFR BLD AUTO: 0.2 % (ref 0–2)
BUN SERPL-MCNC: 61 MG/DL (ref 7–18)
CALCIUM SERPL-MCNC: 8.9 MG/DL (ref 8.5–10.1)
CHLORIDE SERPL-SCNC: 99 MMOL/L (ref 98–107)
CO2 SERPL-SCNC: 15 MMOL/L (ref 21–32)
CREAT SERPL-MCNC: 4 MG/DL (ref 0.6–1.3)
CREAT UR-MCNC: < 13 MG/DL (ref 30–125)
DO-HGB MFR BLDA: 47.9 MMHG
EOSINOPHIL NFR BLD AUTO: 0 % (ref 0–6)
GLUCOSE SERPL-MCNC: 99 MG/DL (ref 74–106)
HCT VFR BLD AUTO: 41 % (ref 39–51)
HGB BLD-MCNC: 12 G/DL (ref 13.5–17.5)
INHALED O2 CONCENTRATION: 28 %
LYMPHOCYTES NFR BLD AUTO: 0.8 K/UL (ref 0.8–4.8)
LYMPHOCYTES NFR BLD AUTO: 3.2 % (ref 20–44)
MAGNESIUM SERPL-MCNC: 2.5 MG/DL (ref 1.8–2.4)
MCHC RBC AUTO-ENTMCNC: 29 G/DL (ref 31–36)
MCV RBC AUTO: 84 FL (ref 80–96)
MONOCYTES NFR BLD AUTO: 1.5 K/UL (ref 0.1–1.3)
MONOCYTES NFR BLD AUTO: 6.4 % (ref 2–12)
NEUTROPHILS # BLD AUTO: 21.5 K/UL (ref 1.8–8.9)
NEUTROPHILS NFR BLD AUTO: 90.2 % (ref 43–81)
PCO2 TEMP ADJ BLDA: 33.3 MMHG (ref 35–45)
PH TEMP ADJ BLDA: 7.27 [PH] (ref 7.35–7.45)
PLATELET # BLD AUTO: 244 K/UL (ref 150–450)
PO2 TEMP ADJ BLDA: 112.4 MMHG (ref 75–100)
POTASSIUM SERPL-SCNC: 5.2 MMOL/L (ref 3.5–5.1)
RBC # BLD AUTO: 4.88 MIL/UL (ref 4.5–6)
SAO2 % BLDA: 97.9 % (ref 92–98.5)
SODIUM SERPL-SCNC: 139 MMOL/L (ref 136–145)
SODIUM UR-SCNC: 157 MMOL/L (ref 40–220)
VENTILATION MODE VENT: (no result)
WBC NRBC COR # BLD AUTO: 23.8 K/UL (ref 4.3–11)

## 2021-07-15 PROCEDURE — 05H933Z INSERTION OF INFUSION DEVICE INTO RIGHT BRACHIAL VEIN, PERCUTANEOUS APPROACH: ICD-10-PCS | Performed by: NURSE PRACTITIONER

## 2021-07-15 RX ADMIN — HEPARIN SODIUM SCH UNITS: 5000 INJECTION INTRAVENOUS; SUBCUTANEOUS at 08:52

## 2021-07-15 RX ADMIN — HEPARIN SODIUM SCH UNITS: 5000 INJECTION INTRAVENOUS; SUBCUTANEOUS at 20:45

## 2021-07-15 RX ADMIN — CLOTRIMAZOLE SCH GM: 1 CREAM TOPICAL at 17:10

## 2021-07-15 RX ADMIN — PANTOPRAZOLE SODIUM SCH MG: 40 TABLET, DELAYED RELEASE ORAL at 07:30

## 2021-07-15 RX ADMIN — ASPIRIN 81 MG SCH MG: 81 TABLET ORAL at 09:00

## 2021-07-15 RX ADMIN — CEFEPIME HYDROCHLORIDE SCH MLS/HR: 1 INJECTION, POWDER, FOR SOLUTION INTRAMUSCULAR; INTRAVENOUS at 13:23

## 2021-07-15 RX ADMIN — Medication SCH TAB: at 09:00

## 2021-07-15 RX ADMIN — DEXTROSE MONOHYDRATE SCH MLS/HR: 50 INJECTION, SOLUTION INTRAVENOUS at 14:42

## 2021-07-15 RX ADMIN — FERROUS SULFATE TAB 325 MG (65 MG ELEMENTAL FE) SCH MG: 325 (65 FE) TAB at 09:00

## 2021-07-15 RX ADMIN — FERROUS SULFATE TAB 325 MG (65 MG ELEMENTAL FE) SCH MG: 325 (65 FE) TAB at 17:00

## 2021-07-15 RX ADMIN — CLOTRIMAZOLE SCH GM: 1 CREAM TOPICAL at 09:00

## 2021-07-15 NOTE — NUR
rn note



abg results relayed to Np Mohan. no new orders made regarding abg. Np ordered CT head w/o 
contrast noted and carried out.

## 2021-07-15 NOTE — NUR
RN NOTES



RECEIVED REPORT FROM DEMI CHARGE NURSE FOR LISANDRO.

PATIENT ALERT X 3. FOLLOWS COMMAND BUT TRIES TO GET OUT OF BED,BILATERAL SOFT WRIST 
RESTRAINTS IN PLACE.

CHECKED AND WILL CONTINUE TO CHECK Q 2 HOURS.

FOR HIDA SCAN AT 1500.

## 2021-07-15 NOTE — NUR
RN NOTE



7/14 2322 ADMITTED PT FROM ER WITH DIAGNOSIS OF SEVERE SEPSIS, ANASARCA, ARF AND NSTEMI. PT 
ALERT ORIENTED X 2, WITH SOME CONFUSION. NOTED WITH SOME WHEEZING. PT DENIES ANY DIFFICULTY 
OF BREATHING, ON O2 AT 4L SATING AT 99%. NO SIGNS OF DISTRESS NOTED. GODIENZ CATH IN PLACE. 
HOOKED TO TELE MONITOR SHOWS SR. PT AFEBRILE. NOTED WITH RASHES ON PERINEAL AREA. KEPT CLEAN 
AND DRY. ALL SAFETY MEASURE IN PLACE. BED LOCKED IN LOWEST POSITION. WILL CONTINUE TO 
MONITOR. /64 T 97.4 RR 20 P 98.

## 2021-07-15 NOTE — NUR
RN NOTES,



PATIENT TAKEN TO CT SCAN AND HIDA AT THIS TIME, ACCOMPANIED BY PRIMARY NURSE AND RADIOLOGIST 
WITH ALL ACLS PROTOCOL.

## 2021-07-15 NOTE — NUR
rn note



obtained consent for hepato biliary hida scan from patient's wife, Yuli. witnessed with 
other nurse, rachid.

## 2021-07-15 NOTE — NUR
RN NOTE



PATIENT PULLING OUT GODINEZ CATHETER, REMOVING GOWN. NP SPENCER MADE AWARE. OBTAINED AN ORDER FOR 
SOFT RESTRAINTS. PT FOR HIDA SCAN THIS AM AS ORDERED. OBTAINED AN ORDER FOR NPO. ORDERS 
NOTED AND CARRIED OUT.

## 2021-07-15 NOTE — NUR
patient seen by dr. paris renal md ,murcia changed with joeldettduke no urine output,patient 
complaining urge to urinate,dr. paris did bladder ultrasound with 145ml  urine. per md ok 
to leave out murcia.

## 2021-07-15 NOTE — NUR
WOUND CARE CONSULT: REVIEWED CHART, NURSING DOCUMENTATION AND PHOTOS WHICH INDICATE RASHES 
TO PERINEAL AREAS AND INNER THIGHS, SACRAL SCARRING AND SUTURES TO NECK, ALL PRESENT ON 
ADMISSION. RECOMMENDATIONS MADE FOR SKIN PROTECTION. DISCUSSED WITH NURSING STAFF. DEFER TO 
MD FOR NECK SUTURES. MD IN AGREEMENT WITH PLAN OF CARE. CURRENT NIHARIKA SCORE IS 16.

## 2021-07-15 NOTE — NUR
RN NOTES



ALL NEEDS MET AT THIS TIME. DR. HERR NOTIFIED EARLIER ABOUT PATIENT WITH NO URINE OUTPUT 
UP TO THIS TIME. ORDERED TO DO BLADDER SCAN Q 6 HOURS AND INSERT GODINEZ IF URINE >300 ML.

AWAITING HIDA SCAN RESULT. 

SAFETY MEASURES IN PLACE. BED LOW LOCKED. CALL LIGHT WITHIN REACH. ENDORSED TO NEXT SHIFT 
FOR LISANDRO.

## 2021-07-15 NOTE — NUR
TELE RN NOTES



RECEIVED  IN BED AWAKE, O2 2LITERS VIA NC SATING 99% NO SOB , NO DISTRESS NOTED  ,HAD BM MIX 
 WITH URINE .PT REMAIN ON ROSE MARIE WRIST RESTRAINTS,  GOOD CIRCULATION NO SKIN BREAKDOWN NOTED. 
IV FLUIDS  INFUSING WELL AS ORDERED ,PT REMAIN NPO. V/S STABLE AFEBRILE. ALL SAFETY MEASURES 
 MAINTAINED, WILL ENDORSE TO NEXT SHIFT NURSE FOR LISANDRO.

## 2021-07-15 NOTE — NUR
RN NOTES,



ENDORSED PT FROM DEMI STEWART FOR CONTINUATION OF CARE, PATIENT CONFUSED,  AT RA, NO SOB/DISTRESS 
NOTED, ON BILATERAL RESTRAINS, NO CIRCULATION COMPROMISED, AFEBRILE, SAFETY MAINTAINED, WILL 
CONTINUE TO MONITOR CLOSELY.

## 2021-07-15 NOTE — NUR
RN NOTE



PT REMAINS IN BED, O2 REMOVED, NO DISTRESS NOTED. ABOUT 5 ML, URINE OUTPUT FROM GODINEZ. 
CHANGED GODINEZ CATH, STILL HAS NO OUTPUT. NP SPENCER NOTIFIED, PER MD JUST LEAVE THE GODINEZ IN. 
PT REMAIN ON ROSE MARIE WRIST RESTRAINTS, GOOD CIRCULATION NO SKIN BREAKDOWN NOTED. PT REMAIN NPO. 
REMAIN AFEBRIL. ALL SAFETY MAINTAINED, WILL ENDORSE TO NEXT SHIFT NURSE FOR LISANDRO.

## 2021-07-15 NOTE — NUR
RN NOTES,



CAME BACK FROM NUCLEAR MEDICINE,  PER RADIOLOGIST, PATIENT NEED TO BE TAKEN AGAIN AT 1500, 
CHARGE NURSE DEMI AWARE AND ENDORSED PATIENT TO HER   FOR CONTINUATION OF CARE, NO SOB/ACUTE 
DISTRESS NOTED, NEPHROLOGIST AT BEDSIDE AT THIS TIME TO ASSES PATIENT.

## 2021-07-16 VITALS — SYSTOLIC BLOOD PRESSURE: 98 MMHG | DIASTOLIC BLOOD PRESSURE: 61 MMHG

## 2021-07-16 VITALS — DIASTOLIC BLOOD PRESSURE: 79 MMHG | SYSTOLIC BLOOD PRESSURE: 127 MMHG

## 2021-07-16 VITALS — SYSTOLIC BLOOD PRESSURE: 94 MMHG | DIASTOLIC BLOOD PRESSURE: 61 MMHG

## 2021-07-16 VITALS — DIASTOLIC BLOOD PRESSURE: 65 MMHG | SYSTOLIC BLOOD PRESSURE: 107 MMHG

## 2021-07-16 VITALS — SYSTOLIC BLOOD PRESSURE: 112 MMHG | DIASTOLIC BLOOD PRESSURE: 62 MMHG

## 2021-07-16 VITALS — DIASTOLIC BLOOD PRESSURE: 62 MMHG | SYSTOLIC BLOOD PRESSURE: 111 MMHG

## 2021-07-16 LAB
ALBUMIN SERPL BCP-MCNC: 2.7 G/DL (ref 3.4–5)
ALP SERPL-CCNC: 145 U/L (ref 46–116)
ALT SERPL W P-5'-P-CCNC: 204 U/L (ref 12–78)
AST SERPL W P-5'-P-CCNC: 173 U/L (ref 15–37)
BASOPHILS # BLD AUTO: 0.1 K/UL (ref 0–0.2)
BASOPHILS NFR BLD AUTO: 0.3 % (ref 0–2)
BILIRUB SERPL-MCNC: 4.1 MG/DL (ref 0.2–1)
BUN SERPL-MCNC: 74 MG/DL (ref 7–18)
CALCIUM SERPL-MCNC: 7.8 MG/DL (ref 8.5–10.1)
CHLORIDE SERPL-SCNC: 102 MMOL/L (ref 98–107)
CO2 SERPL-SCNC: 28 MMOL/L (ref 21–32)
CREAT SERPL-MCNC: 4.5 MG/DL (ref 0.6–1.3)
EOSINOPHIL NFR BLD AUTO: 0 % (ref 0–6)
GLUCOSE SERPL-MCNC: 176 MG/DL (ref 74–106)
HCT VFR BLD AUTO: 35 % (ref 39–51)
HGB BLD-MCNC: 10.8 G/DL (ref 13.5–17.5)
LYMPHOCYTES NFR BLD AUTO: 0.5 K/UL (ref 0.8–4.8)
LYMPHOCYTES NFR BLD AUTO: 2.3 % (ref 20–44)
MAGNESIUM SERPL-MCNC: 2.5 MG/DL (ref 1.8–2.4)
MCHC RBC AUTO-ENTMCNC: 31 G/DL (ref 31–36)
MCV RBC AUTO: 81 FL (ref 80–96)
MONOCYTES NFR BLD AUTO: 1.2 K/UL (ref 0.1–1.3)
MONOCYTES NFR BLD AUTO: 5.7 % (ref 2–12)
NEUTROPHILS # BLD AUTO: 18.6 K/UL (ref 1.8–8.9)
NEUTROPHILS NFR BLD AUTO: 91.7 % (ref 43–81)
PHOSPHATE SERPL-MCNC: 6.8 MG/DL (ref 2.5–4.9)
PLATELET # BLD AUTO: 187 K/UL (ref 150–450)
POTASSIUM SERPL-SCNC: 4.4 MMOL/L (ref 3.5–5.1)
PROT SERPL-MCNC: 5.1 G/DL (ref 6.4–8.2)
RBC # BLD AUTO: 4.32 MIL/UL (ref 4.5–6)
SODIUM SERPL-SCNC: 141 MMOL/L (ref 136–145)
WBC NRBC COR # BLD AUTO: 20.2 K/UL (ref 4.3–11)

## 2021-07-16 PROCEDURE — 06HM33Z INSERTION OF INFUSION DEVICE INTO RIGHT FEMORAL VEIN, PERCUTANEOUS APPROACH: ICD-10-PCS | Performed by: NURSE PRACTITIONER

## 2021-07-16 PROCEDURE — B54BZZA ULTRASONOGRAPHY OF RIGHT LOWER EXTREMITY VEINS, GUIDANCE: ICD-10-PCS | Performed by: NURSE PRACTITIONER

## 2021-07-16 RX ADMIN — PANTOPRAZOLE SODIUM SCH MG: 40 TABLET, DELAYED RELEASE ORAL at 08:14

## 2021-07-16 RX ADMIN — HEPARIN SODIUM SCH UNITS: 5000 INJECTION INTRAVENOUS; SUBCUTANEOUS at 20:25

## 2021-07-16 RX ADMIN — CLOTRIMAZOLE SCH GM: 1 CREAM TOPICAL at 09:28

## 2021-07-16 RX ADMIN — FERROUS SULFATE TAB 325 MG (65 MG ELEMENTAL FE) SCH MG: 325 (65 FE) TAB at 17:16

## 2021-07-16 RX ADMIN — DEXTROSE MONOHYDRATE PRN MG: 50 INJECTION, SOLUTION INTRAVENOUS at 11:27

## 2021-07-16 RX ADMIN — Medication SCH TAB: at 09:16

## 2021-07-16 RX ADMIN — HEPARIN SODIUM SCH UNITS: 5000 INJECTION INTRAVENOUS; SUBCUTANEOUS at 09:17

## 2021-07-16 RX ADMIN — CLOTRIMAZOLE SCH GM: 1 CREAM TOPICAL at 17:22

## 2021-07-16 RX ADMIN — DEXTROSE MONOHYDRATE SCH MLS/HR: 50 INJECTION, SOLUTION INTRAVENOUS at 20:58

## 2021-07-16 RX ADMIN — ASPIRIN 81 MG SCH MG: 81 TABLET ORAL at 09:16

## 2021-07-16 RX ADMIN — DEXTROSE MONOHYDRATE SCH MLS/HR: 50 INJECTION, SOLUTION INTRAVENOUS at 00:29

## 2021-07-16 RX ADMIN — FERROUS SULFATE TAB 325 MG (65 MG ELEMENTAL FE) SCH MG: 325 (65 FE) TAB at 09:16

## 2021-07-16 RX ADMIN — DEXTROSE MONOHYDRATE SCH MLS/HR: 50 INJECTION, SOLUTION INTRAVENOUS at 09:11

## 2021-07-16 RX ADMIN — CEFEPIME HYDROCHLORIDE SCH MLS/HR: 1 INJECTION, POWDER, FOR SOLUTION INTRAMUSCULAR; INTRAVENOUS at 13:06

## 2021-07-16 NOTE — NUR
Anoop Herrera NP here, inserted Hemodialysis catherter to the right groin, not using the 
right jugular D/T he commented last time the patient bled.  right groin used HD catheter and 
combined with  a central line..

## 2021-07-16 NOTE — NUR
received patient in bed alert orientated x2

speech clear 

report from off-going RN Bladder scan was 360 ml  order reads if bladder scan >300 ml place 
murcia catheter placed my the off-going RN  kevin color urine noted

noted wrist restraint on when I took them off he reached for the murcia  allowed him to have 
them off while I was in the room and replaced them when I was out of the room

## 2021-07-16 NOTE — NUR
tele rn notes

bladder scan done  at 6am is 190 ml  of urine  noted .will endorse to rn day shift  for 
continuity of care.pts remain on 2liters of o2  via nc sating9%.

## 2021-07-16 NOTE — NUR
call from Anoop Herrera to get a consent from wife for Dialysis catheter insertion  
tonight

Called wife Yuli she was already aware that this was going to be done.  Explained to the 
patient , he comment he had this done before.

## 2021-07-17 VITALS — SYSTOLIC BLOOD PRESSURE: 100 MMHG | DIASTOLIC BLOOD PRESSURE: 61 MMHG

## 2021-07-17 VITALS — DIASTOLIC BLOOD PRESSURE: 63 MMHG | SYSTOLIC BLOOD PRESSURE: 108 MMHG

## 2021-07-17 VITALS — DIASTOLIC BLOOD PRESSURE: 52 MMHG | SYSTOLIC BLOOD PRESSURE: 97 MMHG

## 2021-07-17 VITALS — DIASTOLIC BLOOD PRESSURE: 57 MMHG | SYSTOLIC BLOOD PRESSURE: 104 MMHG

## 2021-07-17 VITALS — SYSTOLIC BLOOD PRESSURE: 95 MMHG | DIASTOLIC BLOOD PRESSURE: 58 MMHG

## 2021-07-17 VITALS — DIASTOLIC BLOOD PRESSURE: 61 MMHG | SYSTOLIC BLOOD PRESSURE: 109 MMHG

## 2021-07-17 LAB
BASOPHILS # BLD AUTO: 0 K/UL (ref 0–0.2)
BASOPHILS NFR BLD AUTO: 0.2 % (ref 0–2)
BUN SERPL-MCNC: 74 MG/DL (ref 7–18)
CALCIUM SERPL-MCNC: 7.3 MG/DL (ref 8.5–10.1)
CHLORIDE SERPL-SCNC: 102 MMOL/L (ref 98–107)
CO2 SERPL-SCNC: 32 MMOL/L (ref 21–32)
CREAT SERPL-MCNC: 4 MG/DL (ref 0.6–1.3)
EOSINOPHIL NFR BLD AUTO: 0.4 % (ref 0–6)
GLUCOSE SERPL-MCNC: 155 MG/DL (ref 74–106)
HCT VFR BLD AUTO: 36 % (ref 39–51)
HGB BLD-MCNC: 10.9 G/DL (ref 13.5–17.5)
LYMPHOCYTES NFR BLD AUTO: 0.6 K/UL (ref 0.8–4.8)
LYMPHOCYTES NFR BLD AUTO: 3.6 % (ref 20–44)
MAGNESIUM SERPL-MCNC: 2.5 MG/DL (ref 1.8–2.4)
MCHC RBC AUTO-ENTMCNC: 30 G/DL (ref 31–36)
MCV RBC AUTO: 83 FL (ref 80–96)
MONOCYTES NFR BLD AUTO: 1 K/UL (ref 0.1–1.3)
MONOCYTES NFR BLD AUTO: 6.3 % (ref 2–12)
NEUTROPHILS # BLD AUTO: 14.2 K/UL (ref 1.8–8.9)
NEUTROPHILS NFR BLD AUTO: 89.5 % (ref 43–81)
PHOSPHATE SERPL-MCNC: 5.7 MG/DL (ref 2.5–4.9)
PLATELET # BLD AUTO: 179 K/UL (ref 150–450)
POTASSIUM SERPL-SCNC: 3.8 MMOL/L (ref 3.5–5.1)
RBC # BLD AUTO: 4.36 MIL/UL (ref 4.5–6)
SODIUM SERPL-SCNC: 142 MMOL/L (ref 136–145)
WBC NRBC COR # BLD AUTO: 15.9 K/UL (ref 4.3–11)

## 2021-07-17 RX ADMIN — HEPARIN SODIUM SCH UNITS: 5000 INJECTION INTRAVENOUS; SUBCUTANEOUS at 21:29

## 2021-07-17 RX ADMIN — DEXTROSE MONOHYDRATE SCH MLS/HR: 50 INJECTION, SOLUTION INTRAVENOUS at 05:30

## 2021-07-17 RX ADMIN — PANTOPRAZOLE SODIUM SCH MG: 40 TABLET, DELAYED RELEASE ORAL at 08:02

## 2021-07-17 RX ADMIN — CLOTRIMAZOLE SCH GM: 1 CREAM TOPICAL at 17:58

## 2021-07-17 RX ADMIN — HEPARIN SODIUM SCH UNITS: 5000 INJECTION INTRAVENOUS; SUBCUTANEOUS at 08:43

## 2021-07-17 RX ADMIN — FERROUS SULFATE TAB 325 MG (65 MG ELEMENTAL FE) SCH MG: 325 (65 FE) TAB at 17:58

## 2021-07-17 RX ADMIN — FERROUS SULFATE TAB 325 MG (65 MG ELEMENTAL FE) SCH MG: 325 (65 FE) TAB at 08:42

## 2021-07-17 RX ADMIN — ASPIRIN 81 MG SCH MG: 81 TABLET ORAL at 08:42

## 2021-07-17 RX ADMIN — Medication SCH TAB: at 08:42

## 2021-07-17 RX ADMIN — CEFEPIME HYDROCHLORIDE SCH MLS/HR: 1 INJECTION, POWDER, FOR SOLUTION INTRAMUSCULAR; INTRAVENOUS at 12:43

## 2021-07-17 RX ADMIN — TEMAZEPAM PRN MG: 7.5 CAPSULE ORAL at 23:22

## 2021-07-17 RX ADMIN — CLOTRIMAZOLE SCH GM: 1 CREAM TOPICAL at 08:44
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
## 2021-07-17 NOTE — NUR
CLOSING NOTES: COVID-19 CAME BACK NEGATIVE PATIENTTIVE SWITCHED TO ROOM 116 FROM 105  
RESTORIL 7.5 CAPSUL GIVEN FOR SLEEP PER HIS REQUIEST "I NEED SOMETHING TO MAKE ME SLEEP", 
THE RESTORIL WAS EFFECTIVE. WRIST RETRAINT LOSSENEN WHEN NURSE IN THE ROOM AND ALLLOWED THE 
PATIENT TO MOVE ARMS ABOUT   GODINEZ OUTPUT 500ML YAO CLEAR COLORED URINE.  NO SOB THIS 
12HOURS

## 2021-07-18 VITALS — DIASTOLIC BLOOD PRESSURE: 49 MMHG | SYSTOLIC BLOOD PRESSURE: 105 MMHG

## 2021-07-18 VITALS — DIASTOLIC BLOOD PRESSURE: 62 MMHG | SYSTOLIC BLOOD PRESSURE: 107 MMHG

## 2021-07-18 VITALS — SYSTOLIC BLOOD PRESSURE: 95 MMHG | DIASTOLIC BLOOD PRESSURE: 54 MMHG

## 2021-07-18 VITALS — DIASTOLIC BLOOD PRESSURE: 70 MMHG | SYSTOLIC BLOOD PRESSURE: 110 MMHG

## 2021-07-18 VITALS — DIASTOLIC BLOOD PRESSURE: 58 MMHG | SYSTOLIC BLOOD PRESSURE: 106 MMHG

## 2021-07-18 VITALS — SYSTOLIC BLOOD PRESSURE: 115 MMHG | DIASTOLIC BLOOD PRESSURE: 72 MMHG

## 2021-07-18 LAB
ALBUMIN SERPL BCP-MCNC: 2.4 G/DL (ref 3.4–5)
ALP SERPL-CCNC: 138 U/L (ref 46–116)
ALT SERPL W P-5'-P-CCNC: 173 U/L (ref 12–78)
AST SERPL W P-5'-P-CCNC: 115 U/L (ref 15–37)
BASOPHILS # BLD AUTO: 0 K/UL (ref 0–0.2)
BASOPHILS NFR BLD AUTO: 0.3 % (ref 0–2)
BILIRUB SERPL-MCNC: 3 MG/DL (ref 0.2–1)
BUN SERPL-MCNC: 52 MG/DL (ref 7–18)
CALCIUM SERPL-MCNC: 7.6 MG/DL (ref 8.5–10.1)
CHLORIDE SERPL-SCNC: 104 MMOL/L (ref 98–107)
CO2 SERPL-SCNC: 33 MMOL/L (ref 21–32)
CREAT SERPL-MCNC: 2.8 MG/DL (ref 0.6–1.3)
EOSINOPHIL NFR BLD AUTO: 0.5 % (ref 0–6)
GLUCOSE SERPL-MCNC: 98 MG/DL (ref 74–106)
HCT VFR BLD AUTO: 35 % (ref 39–51)
HGB BLD-MCNC: 10.9 G/DL (ref 13.5–17.5)
LYMPHOCYTES NFR BLD AUTO: 0.5 K/UL (ref 0.8–4.8)
LYMPHOCYTES NFR BLD AUTO: 4.6 % (ref 20–44)
MAGNESIUM SERPL-MCNC: 2.1 MG/DL (ref 1.8–2.4)
MCHC RBC AUTO-ENTMCNC: 31 G/DL (ref 31–36)
MCV RBC AUTO: 82 FL (ref 80–96)
MONOCYTES NFR BLD AUTO: 0.9 K/UL (ref 0.1–1.3)
MONOCYTES NFR BLD AUTO: 7.9 % (ref 2–12)
NEUTROPHILS # BLD AUTO: 9.8 K/UL (ref 1.8–8.9)
NEUTROPHILS NFR BLD AUTO: 86.7 % (ref 43–81)
PHOSPHATE SERPL-MCNC: 4 MG/DL (ref 2.5–4.9)
PLATELET # BLD AUTO: 125 K/UL (ref 150–450)
POTASSIUM SERPL-SCNC: 3.7 MMOL/L (ref 3.5–5.1)
PROT SERPL-MCNC: 4.9 G/DL (ref 6.4–8.2)
RBC # BLD AUTO: 4.34 MIL/UL (ref 4.5–6)
SODIUM SERPL-SCNC: 142 MMOL/L (ref 136–145)
WBC NRBC COR # BLD AUTO: 11.3 K/UL (ref 4.3–11)

## 2021-07-18 PROCEDURE — 5A1D70Z PERFORMANCE OF URINARY FILTRATION, INTERMITTENT, LESS THAN 6 HOURS PER DAY: ICD-10-PCS | Performed by: REGISTERED NURSE

## 2021-07-18 RX ADMIN — HEPARIN SODIUM SCH UNITS: 5000 INJECTION INTRAVENOUS; SUBCUTANEOUS at 21:00

## 2021-07-18 RX ADMIN — CLOTRIMAZOLE SCH GM: 1 CREAM TOPICAL at 08:08

## 2021-07-18 RX ADMIN — MEROPENEM SCH MLS/HR: 500 INJECTION INTRAVENOUS at 21:41

## 2021-07-18 RX ADMIN — Medication SCH TAB: at 08:07

## 2021-07-18 RX ADMIN — HEPARIN SODIUM SCH UNITS: 5000 INJECTION INTRAVENOUS; SUBCUTANEOUS at 08:11

## 2021-07-18 RX ADMIN — PANTOPRAZOLE SODIUM SCH MG: 40 TABLET, DELAYED RELEASE ORAL at 08:07

## 2021-07-18 RX ADMIN — FERROUS SULFATE TAB 325 MG (65 MG ELEMENTAL FE) SCH MG: 325 (65 FE) TAB at 08:07

## 2021-07-18 RX ADMIN — CLOTRIMAZOLE SCH GM: 1 CREAM TOPICAL at 17:08

## 2021-07-18 RX ADMIN — FERROUS SULFATE TAB 325 MG (65 MG ELEMENTAL FE) SCH MG: 325 (65 FE) TAB at 16:45

## 2021-07-18 RX ADMIN — CEFEPIME HYDROCHLORIDE SCH MLS/HR: 1 INJECTION, POWDER, FOR SOLUTION INTRAMUSCULAR; INTRAVENOUS at 12:06

## 2021-07-18 RX ADMIN — ASPIRIN 81 MG SCH MG: 81 TABLET ORAL at 08:07

## 2021-07-18 NOTE — NUR
TELE RN OPENING NOTE



RECEIVED PT  RESTING IN BED. PATIENT IS A/O X 1-2 PATIENT IS BREATHING EVENLY AND NONLABORED 
ON O2 AT 2L. NO SIGNS OF DISTRESS NOTED. ON TELE MONITORING SHOWS SR. DENIES ANY PAIN AT 
THIS TIME. PT NOTED WITH  BILATERAL WRIST SOFT RESTRAINTS, GOOD CIRCULATION, WILL MONITOR. 
MIDLINE ON MARISOL PATENT AND INTACT, R FEMORAL HD CATH INTACT, NO SIGNS OF BLEEDING NOTED. 
GODINEZ IN PLACE, DRAINING CLEAR URINE OUTPUT. ALL SAFETY MEASURES IN PLACE. BED LOW LOCKED, 
CALL LIGHT WITHIN REACH SIDE RAILS UP X 2. WILL CONTINUE TO MONITOR.

## 2021-07-18 NOTE — NUR
RN NOTE



PATIENT HAS LOW PLATELETS, MD NOTIFIED MD STATED OKAY  TO GIVE HEPARIN WILL CONTINUE TO 
MONITOR

## 2021-07-18 NOTE — NUR
Patient received at 0505 in stable condition. A&Ox2. VS: /49, HR 82, O2 98%, Temp 
98.7, RR 19.  On O2 2L via NC, tele monitor applied. Soria catheter patent and draining. 
Patient denies SOB, no distress noted upon visual assessment. Breath sounds diminished 
throughout. Swelling seen to BUE, BLE, scrotum. Soft wrist restraints to chelo. wrist, 
released momentarily to ensure circulation and skin intact. Oriented to new staff and new 
unit. Call light within reach. Bed locked in lowest position, HOB at 45 degree angle.

## 2021-07-18 NOTE — NUR
MS RN OPENING NOTE

PT AWAKE IN BED AT THIS TIME. AOX4, ABLE TO MAKE NEEDS KNOWN. NO SOB NOTED. NO C/O PAIN AT 
THIS TIME, NO S/O ANY ACUTE DISTRESS NOTED. RESPIRATIONS EVEN AND UNLABORED, STABLE ON 
OXYGEN 2LNC RECEIVING HD @ R FEMORAL CATH. IV ACCESS MARISOL MIDLINE. INTACT, PATENT AND 
FLUSHING WELL. SAFETY PRECAUTIONS IN PLACE AND MAINTAINED AT ALL TIMES. BED IN LOWEST LOCKED 
POSITION, HOB ELEVATED, SIDE RAILS UP X2, CALL LIGHT AND TABLE WITHIN REACH. FAMILY AT 
BEDSIDE. WILL CONTINUE TO  MONITOR.

## 2021-07-18 NOTE — NUR
rn note



transferred pt to room 325-1 in stable condition. no signs of distress noted. Continue on o2 
at 2L. sinus rhythm on tele monitor.

## 2021-07-18 NOTE — NUR
RN NOTE



PATIENT NOTED WITH SMALL BLEEDING FROM INJECTION SITE AFTER BANDAGE WAS REMOVED FROM THIS 
MORNING. PRESSURE DRESSING REINFORCED. MD NOTIFIED TO HOLD HEPARIN DOSE THIS EVENING. WILL 
ENDORSE

## 2021-07-18 NOTE — NUR
TELE RN CLOSING NOTE



PT  RESTING IN BED. PATIENT IS A/O X 1-2 PATIENT IS BREATHING EVENLY AND NONLABORED ON O2 AT 
2L. NO SIGNS OF DISTRESS NOTED. DENIES ANY PAIN AT THIS TIME. PT NOTED WITH  BILATERAL WRIST 
SOFT RESTRAINTS, GOOD CIRCULATION, CIRCULATION CHECKS PERFORMED THROUGHOUT SHIFT, 
NOURISHMENT PROVIDED  MIDLINE ON MARISOL PATENT AND INTACT, R FEMORAL HD CATH INTACT, NO SIGNS 
OF BLEEDING NOTED. GODINEZ IN PLACE, DRAINING CLEAR URINE OUTPUT. ALL MEDICATION GIVEN AS 
ORDERED. PATIENT HAS DIALYSIS TREATMENT DURING SHIFT. TOLERATED WELL. ALL SAFETY MEASURES IN 
PLACE. BED LOW LOCKED, CALL LIGHT WITHIN REACH SIDE RAILS UP X 2. WILL ENDORSE TO ONCOMING 
SHIFT

## 2021-07-19 VITALS — SYSTOLIC BLOOD PRESSURE: 126 MMHG | DIASTOLIC BLOOD PRESSURE: 65 MMHG

## 2021-07-19 VITALS — SYSTOLIC BLOOD PRESSURE: 116 MMHG | DIASTOLIC BLOOD PRESSURE: 70 MMHG

## 2021-07-19 VITALS — DIASTOLIC BLOOD PRESSURE: 70 MMHG | SYSTOLIC BLOOD PRESSURE: 119 MMHG

## 2021-07-19 LAB
ALBUMIN SERPL BCP-MCNC: 2.7 G/DL (ref 3.4–5)
ALP SERPL-CCNC: 152 U/L (ref 46–116)
ALT SERPL W P-5'-P-CCNC: 174 U/L (ref 12–78)
AST SERPL W P-5'-P-CCNC: 107 U/L (ref 15–37)
BASOPHILS # BLD AUTO: 0 K/UL (ref 0–0.2)
BASOPHILS NFR BLD AUTO: 0.1 % (ref 0–2)
BILIRUB DIRECT SERPL-MCNC: 2.2 MG/DL (ref 0–0.2)
BILIRUB SERPL-MCNC: 3.8 MG/DL (ref 0.2–1)
BUN SERPL-MCNC: 37 MG/DL (ref 7–18)
CALCIUM SERPL-MCNC: 7.9 MG/DL (ref 8.5–10.1)
CHLORIDE SERPL-SCNC: 106 MMOL/L (ref 98–107)
CO2 SERPL-SCNC: 28 MMOL/L (ref 21–32)
CREAT SERPL-MCNC: 2.4 MG/DL (ref 0.6–1.3)
EOSINOPHIL NFR BLD AUTO: 0.3 % (ref 0–6)
GLUCOSE SERPL-MCNC: 119 MG/DL (ref 74–106)
HCT VFR BLD AUTO: 38 % (ref 39–51)
HGB BLD-MCNC: 11.5 G/DL (ref 13.5–17.5)
LYMPHOCYTES NFR BLD AUTO: 0.6 K/UL (ref 0.8–4.8)
LYMPHOCYTES NFR BLD AUTO: 5 % (ref 20–44)
MAGNESIUM SERPL-MCNC: 2.2 MG/DL (ref 1.8–2.4)
MCHC RBC AUTO-ENTMCNC: 30 G/DL (ref 31–36)
MCV RBC AUTO: 82 FL (ref 80–96)
MONOCYTES NFR BLD AUTO: 0.9 K/UL (ref 0.1–1.3)
MONOCYTES NFR BLD AUTO: 7.8 % (ref 2–12)
NEUTROPHILS # BLD AUTO: 10.4 K/UL (ref 1.8–8.9)
NEUTROPHILS NFR BLD AUTO: 86.8 % (ref 43–81)
PHOSPHATE SERPL-MCNC: 3.2 MG/DL (ref 2.5–4.9)
PLATELET # BLD AUTO: 116 K/UL (ref 150–450)
POTASSIUM SERPL-SCNC: 4.2 MMOL/L (ref 3.5–5.1)
PROT SERPL-MCNC: 5.5 G/DL (ref 6.4–8.2)
RBC # BLD AUTO: 4.63 MIL/UL (ref 4.5–6)
SODIUM SERPL-SCNC: 140 MMOL/L (ref 136–145)
WBC NRBC COR # BLD AUTO: 11.9 K/UL (ref 4.3–11)

## 2021-07-19 RX ADMIN — FERROUS SULFATE TAB 325 MG (65 MG ELEMENTAL FE) SCH MG: 325 (65 FE) TAB at 16:54

## 2021-07-19 RX ADMIN — CLOTRIMAZOLE SCH APPLIC: 1 CREAM TOPICAL at 14:28

## 2021-07-19 RX ADMIN — MEROPENEM SCH MLS/HR: 500 INJECTION INTRAVENOUS at 20:34

## 2021-07-19 RX ADMIN — DEXTROSE MONOHYDRATE PRN MG: 50 INJECTION, SOLUTION INTRAVENOUS at 17:21

## 2021-07-19 RX ADMIN — MEROPENEM SCH MLS/HR: 500 INJECTION INTRAVENOUS at 09:26

## 2021-07-19 RX ADMIN — TEMAZEPAM PRN MG: 7.5 CAPSULE ORAL at 21:31

## 2021-07-19 RX ADMIN — PANTOPRAZOLE SODIUM SCH MG: 40 TABLET, DELAYED RELEASE ORAL at 08:48

## 2021-07-19 RX ADMIN — Medication SCH TAB: at 08:48

## 2021-07-19 RX ADMIN — ASPIRIN 81 MG SCH MG: 81 TABLET ORAL at 08:48

## 2021-07-19 RX ADMIN — CLOTRIMAZOLE SCH APPLIC: 1 CREAM TOPICAL at 16:56

## 2021-07-19 RX ADMIN — FERROUS SULFATE TAB 325 MG (65 MG ELEMENTAL FE) SCH MG: 325 (65 FE) TAB at 08:48

## 2021-07-19 NOTE — NUR
m/s lvn: notes



hd in progress. no a/r noted. chelo wrist restraints remains off and discontinued. no distress 
noted. will continue to monitor.

## 2021-07-19 NOTE — NUR
m/s lvn: initial assessment



received pt in bed awake, a/ox2-3. hd in progress. chelo wrist restraints off. reality 
orientation provided prn. will continue to monitor.

## 2021-07-19 NOTE — NUR
m/s lvn: notes



pt verbalized relief of nausea. pt appears to be anxious. encouraged to verbalized feelings. 
family at bedside. needs attended. will continue to monitor.

## 2021-07-19 NOTE — NUR
RN OPENING NOTE



PATIENT AWAKE, WITH GOWN OFF ON UPPER BODY. PATIENT NO LONGER IN RESTRAINTS SINCE AM. 
PATIENT IS A/O X 2-3. MARISOL MIDLINE PATENT AND INTACT. PATIENT HAS A RIGHT FEMORAL HD CATH. 
ENCOURAGED PATIENT NOT TO PULL ON HIS GODINEZ CATHETER OR IV ACCESS. PATIENT VERBALIZES 
UNDERSTANDING. PATIENT ON 2 L OXYGEN SUPPLEMENTATION, NOT IN ANY APPARENT DISTRESS. SAFETY 
MEASURES IN PLACE: BED IN LOCKED AND LOWEST POSITION, CALL LIGHT WITHIN REACH, SIDE RAILS 
UP. WILL MONITOR PATIENT CLOSELY. 

-------------------------------------------------------------------------------

Addendum: 07/19/21 at 2055 by GRACE LAURENT RN

-------------------------------------------------------------------------------

SONIDO OTOOLE ENDORSED THAT FAMILY WANTS PATIENT TO BE DNR/DNI, WILL FOLLOW UP WITH MD.

## 2021-07-19 NOTE — NUR
MS RN CLOSING NOTE

PT IS AWAKE IN BED. A/OX2. PT IS STABLE ON OXYGEN 2L NC, NO SOB NOTED, NO RESPIRATORY 
DISTRESS. PT IS ON BED REST. IV ACCESS IS INTACT, PATENT AND FLUSHING WELL. ALL NEEDS, 
MEDICATIONS AND TREATMENT ADMINISTERED AS ANTICIPATED PER ORDER.P. PT REPOSITIONED Q2H AND 
PRN. SAFETY, SEIZURE, AND ASPIRATION PRECAUTIONS MAINTAINED AT ALL TIMES. BED IN LOWEST 
LOCKED POSITION, HOB ELEVATED, SIDE RAILS UPCX2. CLL LIGHT AND TABLE WITHIN REACH. WILL 
ENDORSE TO ONCOMING NURSE FOR LISANDRO

## 2021-07-19 NOTE — NUR
m/s lvn: notes



hd completed with 1.5 liter uf, ester. well. no distress noted. will continue to monitor.

## 2021-07-19 NOTE — NUR
m/s lvn: notes



family request for psych eval. cn made aware and order received for psych consult. face 
sheet faxed to gps unit and verified with albertina.

## 2021-07-20 VITALS — DIASTOLIC BLOOD PRESSURE: 74 MMHG | SYSTOLIC BLOOD PRESSURE: 124 MMHG

## 2021-07-20 VITALS — SYSTOLIC BLOOD PRESSURE: 134 MMHG | DIASTOLIC BLOOD PRESSURE: 66 MMHG

## 2021-07-20 VITALS — SYSTOLIC BLOOD PRESSURE: 119 MMHG | DIASTOLIC BLOOD PRESSURE: 84 MMHG

## 2021-07-20 LAB
ALBUMIN SERPL BCP-MCNC: 3.1 G/DL (ref 3.4–5)
ALP SERPL-CCNC: 172 U/L (ref 46–116)
ALT SERPL W P-5'-P-CCNC: 196 U/L (ref 12–78)
AST SERPL W P-5'-P-CCNC: 121 U/L (ref 15–37)
BASOPHILS # BLD AUTO: 0 K/UL (ref 0–0.2)
BASOPHILS NFR BLD AUTO: 0.1 % (ref 0–2)
BILIRUB SERPL-MCNC: 6.9 MG/DL (ref 0.2–1)
BUN SERPL-MCNC: 36 MG/DL (ref 7–18)
CALCIUM SERPL-MCNC: 8.4 MG/DL (ref 8.5–10.1)
CHLORIDE SERPL-SCNC: 100 MMOL/L (ref 98–107)
CO2 SERPL-SCNC: 21 MMOL/L (ref 21–32)
CREAT SERPL-MCNC: 3 MG/DL (ref 0.6–1.3)
EOSINOPHIL NFR BLD AUTO: 0 % (ref 0–6)
GLUCOSE SERPL-MCNC: 87 MG/DL (ref 74–106)
HCT VFR BLD AUTO: 41 % (ref 39–51)
HGB BLD-MCNC: 11.9 G/DL (ref 13.5–17.5)
LYMPHOCYTES NFR BLD AUTO: 0.7 K/UL (ref 0.8–4.8)
LYMPHOCYTES NFR BLD AUTO: 4.5 % (ref 20–44)
LYMPHOCYTES NFR BLD MANUAL: 3 % (ref 16–48)
MAGNESIUM SERPL-MCNC: 2.3 MG/DL (ref 1.8–2.4)
MCHC RBC AUTO-ENTMCNC: 29 G/DL (ref 31–36)
MCV RBC AUTO: 86 FL (ref 80–96)
MONOCYTES NFR BLD AUTO: 0.9 K/UL (ref 0.1–1.3)
MONOCYTES NFR BLD AUTO: 6.1 % (ref 2–12)
MONOCYTES NFR BLD MANUAL: 4 % (ref 0–11)
NEUTROPHILS # BLD AUTO: 13.5 K/UL (ref 1.8–8.9)
NEUTROPHILS NFR BLD AUTO: 89.3 % (ref 43–81)
NEUTS SEG NFR BLD MANUAL: 93 % (ref 42–76)
PHOSPHATE SERPL-MCNC: 4.7 MG/DL (ref 2.5–4.9)
PLATELET # BLD AUTO: 91 K/UL (ref 150–450)
POTASSIUM SERPL-SCNC: 4.8 MMOL/L (ref 3.5–5.1)
PROT SERPL-MCNC: 6.2 G/DL (ref 6.4–8.2)
RBC # BLD AUTO: 4.74 MIL/UL (ref 4.5–6)
SODIUM SERPL-SCNC: 138 MMOL/L (ref 136–145)
WBC NRBC COR # BLD AUTO: 15.1 K/UL (ref 4.3–11)

## 2021-07-20 RX ADMIN — PANTOPRAZOLE SODIUM SCH MG: 40 TABLET, DELAYED RELEASE ORAL at 08:40

## 2021-07-20 RX ADMIN — CLOTRIMAZOLE SCH GM: 1 CREAM TOPICAL at 13:46

## 2021-07-20 RX ADMIN — FERROUS SULFATE TAB 325 MG (65 MG ELEMENTAL FE) SCH MG: 325 (65 FE) TAB at 16:35

## 2021-07-20 RX ADMIN — MEROPENEM SCH MLS/HR: 500 INJECTION INTRAVENOUS at 08:44

## 2021-07-20 RX ADMIN — ASPIRIN 81 MG SCH MG: 81 TABLET ORAL at 08:40

## 2021-07-20 RX ADMIN — FERROUS SULFATE TAB 325 MG (65 MG ELEMENTAL FE) SCH MG: 325 (65 FE) TAB at 08:40

## 2021-07-20 RX ADMIN — CLOTRIMAZOLE SCH GM: 1 CREAM TOPICAL at 16:35

## 2021-07-20 RX ADMIN — Medication SCH TAB: at 08:40

## 2021-07-20 RX ADMIN — MEROPENEM SCH MLS/HR: 500 INJECTION INTRAVENOUS at 21:49

## 2021-07-20 NOTE — NUR
RN CLOSING NOTES



PATIENT REMAINS A/OX 2-3 WITH PERIODS OF CONFUSION. PATIENT DOES NOT PULL ON TUBES OR DOES 
NOT TRY TO GET OUT OF BED ANYMORE. HE DOES NOT LIKE TO HAVE THE GOWN ON BECAUSE 'IT'S TOO 
HOT". PATIENT'S MIDLINE PATENT AND INTACT. GODINEZ CATHETER DRAINING YAO/TEA COLORED URINE. 
PATIENT DOES NOT REPORT TO HAVE ANY PAIN AT THIS TIME. PATIENT TOLERATES 2L OF OXYGEN 
SUPPLEMENTATION. BREATHING EVEN AND UNLABORED. SAFETY MEASURES MAINTAINED. WILL ENDORSE TO 
DAY SHIFT NURSE FOR LISANDRO.

## 2021-07-20 NOTE — NUR
RN MS NOTES

PT IN BED, AWAKE, ALERT AND ORIENTED, WITH PERIODS OF CONFUSION, NOT IN DISTRESS, CALL LIGHT 
WITHIN REACH, MARISOL MIDLINE AND RIGHT FEMORAL HD CATH IN PLACE, REPOSITIONED FOR COMFORT, 
NEEDS ATTENDED.

## 2021-07-20 NOTE — NUR
PT C/O INABILITY TO SLEEP. TEMAZEPAM 7.5MG PO HS PRN ADMINISTERED AT THIS TIME. WILL 
CONTINUE TO MONITOR

## 2021-07-20 NOTE — NUR
MS RN OPENING NOTE

PT AWAKE IN BED AT THIS TIME. AOX3, ABLE TO MAKE NEEDS KNOWN. NO SOB NOTED. NO C/O PAIN AT 
THIS TIME, NO S/O ANY ACUTE DISTRESS NOTED. RESPIRATIONS EVEN AND UNLABORED, STABLE ON 
OXYGEN 2LNC. IV ACCESS MARISOL MIDLINE #18 INTACT, PATENT, FLUSHING WELL. R FEMORAL HD CATH IN 
PLACE. SAFETY PRECAUTIONS IN PLACE AND MAINTAINED AT ALL TIMES. BED IN LOWEST LOCKED 
POSITION, HOB ELEVATED, SIDE RAILS UP X2, CALL LIGHT AND TABLE WITHIN REACH. FAMILY AT 
BEDSIDE. WILL CONTINUE TO  MONITOR

## 2021-07-20 NOTE — NUR
RN MS NOTES

PT IN BED, AWAKE, ALERT, WITH CONFUSION AT TIMES, FAMILY AT BEDSIDE, DENIES PAIN, NOT IN 
DISTRESS, CALL LIGHT WITHIN REACH, PM MEDS GIVEN AS ORDERED, PM CARE PROVIDED, WIFE PROVIDED 
A COPY OF PT'S ADVANCE DIRECTIVES, WISHES TO BE DNR/DNI, DR. JOHNSTON INFORMED.

## 2021-07-21 VITALS — DIASTOLIC BLOOD PRESSURE: 56 MMHG | SYSTOLIC BLOOD PRESSURE: 131 MMHG

## 2021-07-21 VITALS — DIASTOLIC BLOOD PRESSURE: 87 MMHG | SYSTOLIC BLOOD PRESSURE: 104 MMHG

## 2021-07-21 VITALS — DIASTOLIC BLOOD PRESSURE: 67 MMHG | SYSTOLIC BLOOD PRESSURE: 109 MMHG

## 2021-07-21 LAB
BILIRUB DIRECT SERPL-MCNC: 4.9 MG/DL (ref 0–0.2)
BUN SERPL-MCNC: 52 MG/DL (ref 7–18)
CALCIUM SERPL-MCNC: 8.4 MG/DL (ref 8.5–10.1)
CHLORIDE SERPL-SCNC: 102 MMOL/L (ref 98–107)
CO2 SERPL-SCNC: 24 MMOL/L (ref 21–32)
CREAT SERPL-MCNC: 3.2 MG/DL (ref 0.6–1.3)
GLUCOSE SERPL-MCNC: 115 MG/DL (ref 74–106)
POTASSIUM SERPL-SCNC: 5.3 MMOL/L (ref 3.5–5.1)
SODIUM SERPL-SCNC: 137 MMOL/L (ref 136–145)

## 2021-07-21 RX ADMIN — MEROPENEM SCH MLS/HR: 500 INJECTION INTRAVENOUS at 21:05

## 2021-07-21 RX ADMIN — CLOTRIMAZOLE SCH GM: 1 CREAM TOPICAL at 16:31

## 2021-07-21 RX ADMIN — FERROUS SULFATE TAB 325 MG (65 MG ELEMENTAL FE) SCH MG: 325 (65 FE) TAB at 16:31

## 2021-07-21 RX ADMIN — Medication SCH TAB: at 08:41

## 2021-07-21 RX ADMIN — ASPIRIN 81 MG SCH MG: 81 TABLET ORAL at 08:41

## 2021-07-21 RX ADMIN — CLOTRIMAZOLE SCH GM: 1 CREAM TOPICAL at 08:49

## 2021-07-21 RX ADMIN — PANTOPRAZOLE SODIUM SCH MG: 40 TABLET, DELAYED RELEASE ORAL at 08:44

## 2021-07-21 RX ADMIN — Medication SCH MLS/HR: at 20:11

## 2021-07-21 RX ADMIN — FERROUS SULFATE TAB 325 MG (65 MG ELEMENTAL FE) SCH MG: 325 (65 FE) TAB at 08:41

## 2021-07-21 RX ADMIN — MEROPENEM SCH MLS/HR: 500 INJECTION INTRAVENOUS at 08:41

## 2021-07-21 NOTE — NUR
RN MS NOTES

PT IN BED, ASLEEP, EASY TO AROUSE, ALERT AND ORIENTED, VERBALLY RESPONSIVE, NO COMPLAINT OF 
PAIN OR ANY DISCOMFORT AT THIS TIME, BREATHING PATTERN NORMAL, CALL LIGHT WITHIN REACH, SEEN 
AND EXAMINED BY ST, NEEDS ATTENDED.

## 2021-07-21 NOTE — NUR
MS RN NOTES



PATIENT IN BED COMFORTABLY SLEEPING BUT EASY TO AROUSE. A/OX3. PATIENT TACHYPNEIC IN 2LPM 
VIA NC. NO C/O PAIN AT THIS TIME. GODINEZ DRAINING CLEAR NAVI YELLOW URINE. NO FLUIDS RUNNING 
AT THIS TIME. NOTED EXTREMITIES SWOLLEN. SAFETY IN PLACE. WILL CONTINUE TO MONITOR.

## 2021-07-21 NOTE — NUR
MS RN CLOSING NOTE

PT IS IN BED WITH EYES CLOSED, EASY TO AROUSE. PT IS STABLE ON OXYGEN 2L NC, NO SOB NOTED. 
NO S/S OF RESPIRATORY DISTRESS. PT IS BED REST. IV ACCESS IS INTACT, PATENT, AND FLUSHING 
WELL. ALL NEEDS HAVE BEEN MET. ALL CARE, NEEDS, MEDICATIONS, AND TREATMENT ADMINISTERED  AS 
ANTICIPATED PER ORDER. REPOSITIONED PT Q2H AND PRN. SAFETY, SEIZURE, AND ASPIRATION 
PRECAUTIONS MAINTAINED AT ALL TIMES. BED IN LOWEST LOCKED POSITION, HOB ELEVATED, SIDE RAILS 
UPX2. CALL LIGHT AND TABLE WITHIN REACH. WILL ENDORSE TO ONCOMING NURSE FOR LISANDRO.

## 2021-07-21 NOTE — NUR
RN MS NOTES

PT IN BED, ASLEEP, EASY TO AROUSE, ALERT AND ORIENTED, NOT IN DISTRESS, DENIES PAIN, 
ASSISTED WITH DINNER, PM CARE PROVIDED, SEEN BY DR. JOHNSTON, PLAN OF CARE DISCUSSED WITH 
PT'S WIFE, SEEN BY PHYSICAL THERAPIST, TOLERATED ACTIVITIES WELL, ALL NEEDS ATTENDED.

## 2021-07-22 VITALS — SYSTOLIC BLOOD PRESSURE: 110 MMHG | DIASTOLIC BLOOD PRESSURE: 69 MMHG

## 2021-07-22 VITALS — SYSTOLIC BLOOD PRESSURE: 114 MMHG | DIASTOLIC BLOOD PRESSURE: 49 MMHG

## 2021-07-22 VITALS — SYSTOLIC BLOOD PRESSURE: 108 MMHG | DIASTOLIC BLOOD PRESSURE: 57 MMHG

## 2021-07-22 LAB
AMMONIA PLAS-SCNC: 11 UMOL/L (ref 11–32)
BASOPHILS # BLD AUTO: 0 K/UL (ref 0–0.2)
BASOPHILS NFR BLD AUTO: 0.1 % (ref 0–2)
BUN SERPL-MCNC: 44 MG/DL (ref 7–18)
CALCIUM SERPL-MCNC: 8.3 MG/DL (ref 8.5–10.1)
CHLORIDE SERPL-SCNC: 104 MMOL/L (ref 98–107)
CO2 SERPL-SCNC: 29 MMOL/L (ref 21–32)
CREAT SERPL-MCNC: 2.5 MG/DL (ref 0.6–1.3)
EOSINOPHIL NFR BLD AUTO: 0.2 % (ref 0–6)
GLUCOSE SERPL-MCNC: 97 MG/DL (ref 74–106)
HCT VFR BLD AUTO: 33 % (ref 39–51)
HGB BLD-MCNC: 10.1 G/DL (ref 13.5–17.5)
LYMPHOCYTES NFR BLD AUTO: 0.5 K/UL (ref 0.8–4.8)
LYMPHOCYTES NFR BLD AUTO: 3.4 % (ref 20–44)
LYMPHOCYTES NFR BLD MANUAL: 3 % (ref 16–48)
MAGNESIUM SERPL-MCNC: 2.2 MG/DL (ref 1.8–2.4)
MCHC RBC AUTO-ENTMCNC: 31 G/DL (ref 31–36)
MCV RBC AUTO: 83 FL (ref 80–96)
MONOCYTES NFR BLD AUTO: 0.8 K/UL (ref 0.1–1.3)
MONOCYTES NFR BLD AUTO: 5.3 % (ref 2–12)
MONOCYTES NFR BLD MANUAL: 6 % (ref 0–11)
NEUTROPHILS # BLD AUTO: 13.3 K/UL (ref 1.8–8.9)
NEUTROPHILS NFR BLD AUTO: 91 % (ref 43–81)
NEUTS SEG NFR BLD MANUAL: 91 % (ref 42–76)
PHOSPHATE SERPL-MCNC: 4.3 MG/DL (ref 2.5–4.9)
PLATELET # BLD AUTO: 57 K/UL (ref 150–450)
POTASSIUM SERPL-SCNC: 4.2 MMOL/L (ref 3.5–5.1)
RBC # BLD AUTO: 4 MIL/UL (ref 4.5–6)
SODIUM SERPL-SCNC: 140 MMOL/L (ref 136–145)
WBC NRBC COR # BLD AUTO: 14.6 K/UL (ref 4.3–11)

## 2021-07-22 RX ADMIN — CLOTRIMAZOLE SCH GM: 1 CREAM TOPICAL at 08:28

## 2021-07-22 RX ADMIN — FUROSEMIDE SCH MG: 10 INJECTION, SOLUTION INTRAMUSCULAR; INTRAVENOUS at 08:17

## 2021-07-22 RX ADMIN — ASPIRIN 81 MG SCH MG: 81 TABLET ORAL at 08:17

## 2021-07-22 RX ADMIN — FERROUS SULFATE TAB 325 MG (65 MG ELEMENTAL FE) SCH MG: 325 (65 FE) TAB at 16:58

## 2021-07-22 RX ADMIN — FERROUS SULFATE TAB 325 MG (65 MG ELEMENTAL FE) SCH MG: 325 (65 FE) TAB at 08:17

## 2021-07-22 RX ADMIN — MEROPENEM SCH MLS/HR: 500 INJECTION INTRAVENOUS at 08:28

## 2021-07-22 RX ADMIN — CLOTRIMAZOLE SCH GM: 1 CREAM TOPICAL at 17:01

## 2021-07-22 RX ADMIN — Medication SCH MLS/HR: at 03:50

## 2021-07-22 RX ADMIN — TEMAZEPAM PRN MG: 7.5 CAPSULE ORAL at 20:55

## 2021-07-22 RX ADMIN — MEROPENEM SCH MLS/HR: 500 INJECTION INTRAVENOUS at 20:44

## 2021-07-22 RX ADMIN — Medication SCH MLS/HR: at 12:25

## 2021-07-22 RX ADMIN — PANTOPRAZOLE SODIUM SCH MG: 40 TABLET, DELAYED RELEASE ORAL at 08:17

## 2021-07-22 RX ADMIN — Medication SCH TAB: at 08:17

## 2021-07-22 NOTE — NUR
MS/RN OPENING NOTES

RECEIVED PATIENT IN BED SLEEPING COMFORTABLY, EASY TO AROUSE BY NAME AND LIGHT TOUCH. 
PATIENT IN 2 L OXYGEN AT 2L VIA NASAL CANNULA SATURATING WELL. PATIENT IN NO APPARENT 
DISTRESS NOTED.  NO COMPLAINED OF PAIN NOTED AT THIS TIME.  WILL CONTINUE TO MONITOR.

## 2021-07-22 NOTE — NUR
RN CLOSING NOTES



PATIENT IN BED SLEEPING COMFORTABLY, EASY TO AROUSE. NO S/S OF DISTRESS. NO C/O PAIN. ALL 
NEEDS ATTENDED. ALL SCHED MEDS ADMINISTERED.  1.1 LITERS OUT DURING HD LAST NIGHT. NO 
SIGNIFICANT CHANGE SINCE LAST SHIFT. WILL ENDORSE TO MORNING SHIFT RN .

## 2021-07-22 NOTE — NUR
RN NOTES

PATIENT HAD POSITIVE LEFT BRACHIAL VEIN RESULT JOSÉ JOHNSTON WAS NOTIFIED NO NEW ORDER AT 
THIS TIME.

## 2021-07-22 NOTE — NUR
MS/RN CLOSING NOTES

PATIENT IS ON BED AWAKE ALERT AND ORIENTEDX3. PATIENT IS ON ROOM AIR SATURATION 93%. PATIENT 
IN  NO APPARENT RESPIRATORY DISTRESS NOTED AT THIS TIME. SEEN AND EXAMINED BY MD WITH ORDERS 
MADE AND CARRIED OUT. ALL DUE MEDICATIONS WAS GIVEN. SAFETY PRECAUTIONS WAS IN PLACED.  SIDE 
RAILS UP X2. CALL LIGHT WITHIN REACH. WILL ENDORSED TO NIGHT SHIFT FOR LISANDRO.

## 2021-07-22 NOTE — NUR
Patient is awake, A&Ox2. Wife at bedside. No signs of distress. Denies any pain. generalized 
edema noted. F/c patent and draining clear kevin urine. MARISOL midline patent and flushed.

## 2021-07-22 NOTE — NUR
RN NOTES

PATIENT WEAN 2L OXYGEN TO ROOM AIR SATURATION 95% PATIENT IN NO APPARENT RESPIRATORY 
DISTRESS NOTED. WILL CONTINUE TO MONITOR.

## 2021-07-23 VITALS — DIASTOLIC BLOOD PRESSURE: 58 MMHG | SYSTOLIC BLOOD PRESSURE: 120 MMHG

## 2021-07-23 LAB
ALBUMIN SERPL BCP-MCNC: 3.1 G/DL (ref 3.4–5)
ALP SERPL-CCNC: 125 U/L (ref 46–116)
ALT SERPL W P-5'-P-CCNC: 123 U/L (ref 12–78)
AST SERPL W P-5'-P-CCNC: 58 U/L (ref 15–37)
BASOPHILS # BLD AUTO: 0 K/UL (ref 0–0.2)
BASOPHILS NFR BLD AUTO: 0.2 % (ref 0–2)
BILIRUB SERPL-MCNC: 4.6 MG/DL (ref 0.2–1)
BUN SERPL-MCNC: 47 MG/DL (ref 7–18)
CALCIUM SERPL-MCNC: 8.1 MG/DL (ref 8.5–10.1)
CHLORIDE SERPL-SCNC: 104 MMOL/L (ref 98–107)
CO2 SERPL-SCNC: 26 MMOL/L (ref 21–32)
CREAT SERPL-MCNC: 2.3 MG/DL (ref 0.6–1.3)
EOSINOPHIL NFR BLD AUTO: 0.6 % (ref 0–6)
GLUCOSE SERPL-MCNC: 107 MG/DL (ref 74–106)
HCT VFR BLD AUTO: 35 % (ref 39–51)
HGB BLD-MCNC: 10.9 G/DL (ref 13.5–17.5)
LYMPHOCYTES NFR BLD AUTO: 0.8 K/UL (ref 0.8–4.8)
LYMPHOCYTES NFR BLD AUTO: 6.1 % (ref 20–44)
LYMPHOCYTES NFR BLD MANUAL: 3 % (ref 16–48)
MAGNESIUM SERPL-MCNC: 2.1 MG/DL (ref 1.8–2.4)
MCHC RBC AUTO-ENTMCNC: 31 G/DL (ref 31–36)
MCV RBC AUTO: 83 FL (ref 80–96)
MONOCYTES NFR BLD AUTO: 0.9 K/UL (ref 0.1–1.3)
MONOCYTES NFR BLD AUTO: 7.1 % (ref 2–12)
MONOCYTES NFR BLD MANUAL: 9 % (ref 0–11)
NEUTROPHILS # BLD AUTO: 10.7 K/UL (ref 1.8–8.9)
NEUTROPHILS NFR BLD AUTO: 86 % (ref 43–81)
NEUTS SEG NFR BLD MANUAL: 88 % (ref 42–76)
PHOSPHATE SERPL-MCNC: 3.3 MG/DL (ref 2.5–4.9)
PLATELET # BLD AUTO: 66 K/UL (ref 150–450)
POTASSIUM SERPL-SCNC: 4.2 MMOL/L (ref 3.5–5.1)
PROT SERPL-MCNC: 5.2 G/DL (ref 6.4–8.2)
RBC # BLD AUTO: 4.19 MIL/UL (ref 4.5–6)
SODIUM SERPL-SCNC: 139 MMOL/L (ref 136–145)
WBC NRBC COR # BLD AUTO: 12.4 K/UL (ref 4.3–11)

## 2021-07-23 RX ADMIN — Medication SCH ML: at 17:30

## 2021-07-23 RX ADMIN — FUROSEMIDE SCH MG: 10 INJECTION, SOLUTION INTRAMUSCULAR; INTRAVENOUS at 08:01

## 2021-07-23 RX ADMIN — CLOTRIMAZOLE SCH GM: 1 CREAM TOPICAL at 08:04

## 2021-07-23 RX ADMIN — MEROPENEM SCH MLS/HR: 500 INJECTION INTRAVENOUS at 20:07

## 2021-07-23 RX ADMIN — ASPIRIN 81 MG SCH MG: 81 TABLET ORAL at 08:01

## 2021-07-23 RX ADMIN — FERROUS SULFATE TAB 325 MG (65 MG ELEMENTAL FE) SCH MG: 325 (65 FE) TAB at 08:01

## 2021-07-23 RX ADMIN — Medication SCH TAB: at 08:01

## 2021-07-23 RX ADMIN — CLOTRIMAZOLE SCH GM: 1 CREAM TOPICAL at 16:53

## 2021-07-23 RX ADMIN — FERROUS SULFATE TAB 325 MG (65 MG ELEMENTAL FE) SCH MG: 325 (65 FE) TAB at 16:51

## 2021-07-23 RX ADMIN — TEMAZEPAM PRN MG: 7.5 CAPSULE ORAL at 21:04

## 2021-07-23 RX ADMIN — MEROPENEM SCH MLS/HR: 500 INJECTION INTRAVENOUS at 08:03

## 2021-07-23 RX ADMIN — PANTOPRAZOLE SODIUM SCH MG: 40 TABLET, DELAYED RELEASE ORAL at 08:01

## 2021-07-23 NOTE — NUR
MS RN OPENING NOTES

Patient is awake, A&Ox3 but forgetful. Wife at bedside. Patient denies any needs at this 
time. No respiratory distress noted. Has been tolerating room air. L arm elevated on 2 
pillows. Soria patent and draining kevin to tea colored urine.

## 2021-07-23 NOTE — NUR
RN NOTES



PATIENT IN BED RESTING, FAMILY/CAREGIVER AT BEDSIDE. AWAKE AND VERBALLY RESPONSIVE, NOT IN 
ACUTE DISTRESS. MARISOL MIDLINE INTACT AND PATENT. DUE MEDS GIVEN TODAY. GODINEZ CATH IN PLACE, 
DRAINING YELLOW-COLORED URINE. PATIENT REPOSITIONED IN BED FOR COMFORT. WARM COMPRESS 
PROVIDED ON LUE AND ELEVATED W/ PILLOWS. NO SKIN BREAKDOWN NOTED. SAFETY MEASURES 
MAINTAINED. WILL ENDORSE TO NIGHT SHIFT RN FOR LISANDRO.

## 2021-07-23 NOTE — NUR
MS RN CLOSING NOTES

Patient reports that he did not sleep well, despite admin of restoril and positioned 
comfortably in bed per patient. Fell asleep after 0100 and slept until 0500. Output 200ml 
kevin to tea colored urine. L arm elevated with 2 pillows due to DVT area is also leaking 
clear output onto chux d/t edema. No BM. No signs of bleeding. No distress, VSS.

## 2021-07-23 NOTE — NUR
RN NOTES



SEEN PATIENT IN BED, AWAKE AND VERBALLY RESPONSIVE. BREATHING EVEN AND UNLABORED, NOT IN 
ACUTE DISTRESS. ABLE TO MAKE NEEDS KNOWN. ASSISTED W/ DRINKING WATER IN BED. MARISOL MIDLINE 
INTACT AND PATENT; R FEMORAL HD CATH W/ DRESSING CLEAN AND DRY. F/C IN PLACE DRAINING 
YELLOW-COLORED URINE. SAFETY MEASURES IN PLACE. WILL CONTINUE TO MONITOR.

## 2021-07-24 VITALS — SYSTOLIC BLOOD PRESSURE: 120 MMHG | DIASTOLIC BLOOD PRESSURE: 72 MMHG

## 2021-07-24 VITALS — SYSTOLIC BLOOD PRESSURE: 122 MMHG | DIASTOLIC BLOOD PRESSURE: 85 MMHG

## 2021-07-24 VITALS — DIASTOLIC BLOOD PRESSURE: 72 MMHG | SYSTOLIC BLOOD PRESSURE: 141 MMHG

## 2021-07-24 LAB
BASOPHILS # BLD AUTO: 0 K/UL (ref 0–0.2)
BASOPHILS NFR BLD AUTO: 0.2 % (ref 0–2)
BUN SERPL-MCNC: 43 MG/DL (ref 7–18)
CALCIUM SERPL-MCNC: 8.5 MG/DL (ref 8.5–10.1)
CHLORIDE SERPL-SCNC: 103 MMOL/L (ref 98–107)
CO2 SERPL-SCNC: 26 MMOL/L (ref 21–32)
CREAT SERPL-MCNC: 2 MG/DL (ref 0.6–1.3)
EOSINOPHIL NFR BLD AUTO: 0.4 % (ref 0–6)
EOSINOPHIL NFR BLD MANUAL: 2 % (ref 0–4)
GLUCOSE SERPL-MCNC: 161 MG/DL (ref 74–106)
HCT VFR BLD AUTO: 36 % (ref 39–51)
HGB BLD-MCNC: 11.3 G/DL (ref 13.5–17.5)
LYMPHOCYTES NFR BLD AUTO: 0.7 K/UL (ref 0.8–4.8)
LYMPHOCYTES NFR BLD AUTO: 5.9 % (ref 20–44)
LYMPHOCYTES NFR BLD MANUAL: 8 % (ref 16–48)
MCHC RBC AUTO-ENTMCNC: 31 G/DL (ref 31–36)
MCV RBC AUTO: 84 FL (ref 80–96)
MONOCYTES NFR BLD AUTO: 0.8 K/UL (ref 0.1–1.3)
MONOCYTES NFR BLD AUTO: 7.2 % (ref 2–12)
MONOCYTES NFR BLD MANUAL: 4 % (ref 0–11)
NEUTROPHILS # BLD AUTO: 10 K/UL (ref 1.8–8.9)
NEUTROPHILS NFR BLD AUTO: 86.3 % (ref 43–81)
NEUTS SEG NFR BLD MANUAL: 86 % (ref 42–76)
PLATELET # BLD AUTO: 80 K/UL (ref 150–450)
POTASSIUM SERPL-SCNC: 4.2 MMOL/L (ref 3.5–5.1)
RBC # BLD AUTO: 4.29 MIL/UL (ref 4.5–6)
SODIUM SERPL-SCNC: 138 MMOL/L (ref 136–145)
WBC NRBC COR # BLD AUTO: 11.6 K/UL (ref 4.3–11)

## 2021-07-24 RX ADMIN — Medication SCH ML: at 08:24

## 2021-07-24 RX ADMIN — FUROSEMIDE SCH MG: 10 INJECTION, SOLUTION INTRAMUSCULAR; INTRAVENOUS at 08:22

## 2021-07-24 RX ADMIN — FERROUS SULFATE TAB 325 MG (65 MG ELEMENTAL FE) SCH MG: 325 (65 FE) TAB at 16:14

## 2021-07-24 RX ADMIN — FERROUS SULFATE TAB 325 MG (65 MG ELEMENTAL FE) SCH MG: 325 (65 FE) TAB at 08:21

## 2021-07-24 RX ADMIN — CLOTRIMAZOLE SCH GM: 1 CREAM TOPICAL at 08:22

## 2021-07-24 RX ADMIN — Medication SCH ML: at 16:14

## 2021-07-24 RX ADMIN — MEROPENEM SCH MLS/HR: 500 INJECTION INTRAVENOUS at 20:33

## 2021-07-24 RX ADMIN — CLOTRIMAZOLE SCH GM: 1 CREAM TOPICAL at 16:14

## 2021-07-24 RX ADMIN — Medication SCH TAB: at 08:21

## 2021-07-24 RX ADMIN — MEROPENEM SCH MLS/HR: 500 INJECTION INTRAVENOUS at 08:24

## 2021-07-24 RX ADMIN — ASPIRIN 81 MG SCH MG: 81 TABLET ORAL at 08:22

## 2021-07-24 RX ADMIN — PANTOPRAZOLE SODIUM SCH MG: 40 TABLET, DELAYED RELEASE ORAL at 08:22

## 2021-07-24 NOTE — NUR
RN NOTES:

TRIED TO INSERT IV CANNULA ON THE LEFT HAND THERE WAS BACKFLOW, THEN WHEN ABOUT TO FLUSH ITS 
BULGING, NOTIFIED, RN/CN, WILL TRY TO ASK HELP FROM ANOTHER RN TO INSERT IV CANNULA.

## 2021-07-24 NOTE — NUR
RN NOTES:

RECEIVED AWAKE LYING ON BED, REFUSED TO WEAR HIS GOWN, EXPLAINED TO HIM WE WILL JUST COVER 
HIM,HE IS FEELING HOT, A/OX2-3 WITH PERIODS OF CONFUSION AND FORGETFULNESS, (+) ANAZARCA, HE 
HAS PITTING EDEMA +++ ON BUE, BLE, KEPT BUE AND BLE ELEVATED AT ALL TIMES, ORIENTED TO UNIT 
AND STAFF, ON GODINEZ CATH DRAINING INTO YELLOWISH COLORED URINE AT 50 CC LEVEL, ON PUREE 
DIET, ABLE TO TOLERATE THIN LIQUIDS ASPIRATION PRECAUTION OBSERVED, HE ASKED FOR WATER, 
GIVEN, TOLERATED KEPT IN HIGH FOWLERS POSITION, NO COUGHING NOTED.

-MARISOL-ML INTACT, HE HAS RIGHT FEMORAL HD CATH, LAST DIALYSIS 7/23/21.

PLAN:FOR PERMACATH PLACEMENT  PENDING FOR MONDAY WITH DR. WILKINS.

-FALL AND SAFETY PRECAUTION OBSERVED, BED LOW AND LOCKED, KEPT CALL LIGHT WITHIN EASY REACH.

## 2021-07-24 NOTE — NUR
RN NOTES:

ABOUT TO GIVE THE IV/ATB, FOUND PATIENT MIDLINE ON THE MARISOL WAS OUT.CN/RN NOTIFIED, WILL TRY 
TO REINSERT A NEW ONE IV ACCESS.

## 2021-07-24 NOTE — NUR
RN OPENING NOTE



PT AWAKE IN BED RESTING. ON 1L NC O2 FOR SOB . A/O X4 AND ENGLISH SPEAKING. NO CARDIAC 
MONITOR PRESENT. GENERALIZED EDEMA PRESENT. ON BEDREST. F/C PRESENT AND DRAINING WITH CLEAR 
YELLOW FLUID. SKIN PROBLEMS PRESENT, WOUND PICTURES IN CHART, WOUND CARE ORDERED. MARISOL 
MIDLINE PRESENT AND FLUSHES WELL. R FEMORAL HD CATH PRESENT. SAFETY MEASURES IN PLACE. SIDE 
RAILS RAISED. BED LOWERED. CALL LIGHT WITHIN REACH. WILL CONTINUE TO MONITOR.

## 2021-07-24 NOTE — NUR
RN NOTES:

-AT AROUND 2245 ALONSO/RN ABLE TO INSERT IV CANNULA ON THE LEFT THUMB AREA G#24, IV/ATB GIVEN 
IMMEDIATELY.NEEDS ATTENDED, GIVEN ICE CHIPS, REFUSED TO PUT ON HIS GOWN.

## 2021-07-24 NOTE — NUR
RN CLOSING NOTE



PT AWAKE IN BED RESTING. ON RA WITH NO SOB OR RESPIRATORY DISTRESS PRESENT. A/O X4 AND 
ENGLISH SPEAKING. PERIODS OF CONFUSION PRESENT. NO CARDIAC MONITOR PRESENT. GENERALIZED 
EDEMA PRESENT. ON BEDREST. F/C PRESENT AND DRAINING WITH CLEAR YELLOW FLUID. SKIN PROBLEMS 
PRESENT, WOUND PICTURES IN CHART, WOUND CARE ORDERED. MARISOL MIDLINE PRESENT AND FLUSHES WELL. 
R FEMORAL HD CATH PRESENT. SAFETY MEASURES IN PLACE. SIDE RAILS RAISED. BED LOWERED. CALL 
LIGHT WITHIN REACH. REPORT TO BE GIVEN TO NIGHT NURSE FOR LISANDRO,

## 2021-07-25 VITALS — SYSTOLIC BLOOD PRESSURE: 120 MMHG | DIASTOLIC BLOOD PRESSURE: 68 MMHG

## 2021-07-25 VITALS — SYSTOLIC BLOOD PRESSURE: 121 MMHG | DIASTOLIC BLOOD PRESSURE: 68 MMHG

## 2021-07-25 VITALS — DIASTOLIC BLOOD PRESSURE: 84 MMHG | SYSTOLIC BLOOD PRESSURE: 98 MMHG

## 2021-07-25 LAB
ALBUMIN SERPL BCP-MCNC: 3.4 G/DL (ref 3.4–5)
ALP SERPL-CCNC: 138 U/L (ref 46–116)
ALT SERPL W P-5'-P-CCNC: 106 U/L (ref 12–78)
AST SERPL W P-5'-P-CCNC: 50 U/L (ref 15–37)
BASOPHILS # BLD AUTO: 0 K/UL (ref 0–0.2)
BASOPHILS NFR BLD AUTO: 0.1 % (ref 0–2)
BILIRUB SERPL-MCNC: 6.3 MG/DL (ref 0.2–1)
BUN SERPL-MCNC: 50 MG/DL (ref 7–18)
CALCIUM SERPL-MCNC: 8.6 MG/DL (ref 8.5–10.1)
CHLORIDE SERPL-SCNC: 100 MMOL/L (ref 98–107)
CO2 SERPL-SCNC: 22 MMOL/L (ref 21–32)
CREAT SERPL-MCNC: 2.5 MG/DL (ref 0.6–1.3)
EOSINOPHIL NFR BLD AUTO: 0.1 % (ref 0–6)
GLUCOSE SERPL-MCNC: 107 MG/DL (ref 74–106)
HCT VFR BLD AUTO: 37 % (ref 39–51)
HGB BLD-MCNC: 11.2 G/DL (ref 13.5–17.5)
LYMPHOCYTES NFR BLD AUTO: 0.6 K/UL (ref 0.8–4.8)
LYMPHOCYTES NFR BLD AUTO: 5 % (ref 20–44)
MAGNESIUM SERPL-MCNC: 2.1 MG/DL (ref 1.8–2.4)
MCHC RBC AUTO-ENTMCNC: 30 G/DL (ref 31–36)
MCV RBC AUTO: 87 FL (ref 80–96)
MONOCYTES NFR BLD AUTO: 0.9 K/UL (ref 0.1–1.3)
MONOCYTES NFR BLD AUTO: 7.7 % (ref 2–12)
NEUTROPHILS # BLD AUTO: 10.6 K/UL (ref 1.8–8.9)
NEUTROPHILS NFR BLD AUTO: 87.1 % (ref 43–81)
PHOSPHATE SERPL-MCNC: 4.3 MG/DL (ref 2.5–4.9)
PLATELET # BLD AUTO: 102 K/UL (ref 150–450)
POTASSIUM SERPL-SCNC: 4.8 MMOL/L (ref 3.5–5.1)
PROT SERPL-MCNC: 5.9 G/DL (ref 6.4–8.2)
RBC # BLD AUTO: 4.24 MIL/UL (ref 4.5–6)
SODIUM SERPL-SCNC: 137 MMOL/L (ref 136–145)
WBC NRBC COR # BLD AUTO: 12.2 K/UL (ref 4.3–11)

## 2021-07-25 PROCEDURE — 05H933Z INSERTION OF INFUSION DEVICE INTO RIGHT BRACHIAL VEIN, PERCUTANEOUS APPROACH: ICD-10-PCS | Performed by: NURSE PRACTITIONER

## 2021-07-25 RX ADMIN — ASPIRIN 81 MG SCH MG: 81 TABLET ORAL at 09:07

## 2021-07-25 RX ADMIN — FUROSEMIDE SCH MG: 10 INJECTION, SOLUTION INTRAMUSCULAR; INTRAVENOUS at 09:07

## 2021-07-25 RX ADMIN — Medication SCH ML: at 16:15

## 2021-07-25 RX ADMIN — CLOTRIMAZOLE SCH GM: 1 CREAM TOPICAL at 09:08

## 2021-07-25 RX ADMIN — PANTOPRAZOLE SODIUM SCH MG: 40 TABLET, DELAYED RELEASE ORAL at 09:14

## 2021-07-25 RX ADMIN — MEROPENEM SCH MLS/HR: 500 INJECTION INTRAVENOUS at 20:50

## 2021-07-25 RX ADMIN — MEROPENEM SCH MLS/HR: 500 INJECTION INTRAVENOUS at 09:09

## 2021-07-25 RX ADMIN — FERROUS SULFATE TAB 325 MG (65 MG ELEMENTAL FE) SCH MG: 325 (65 FE) TAB at 09:07

## 2021-07-25 RX ADMIN — FERROUS SULFATE TAB 325 MG (65 MG ELEMENTAL FE) SCH MG: 325 (65 FE) TAB at 16:15

## 2021-07-25 RX ADMIN — CLOTRIMAZOLE SCH GM: 1 CREAM TOPICAL at 16:16

## 2021-07-25 RX ADMIN — Medication SCH TAB: at 09:07

## 2021-07-25 RX ADMIN — Medication SCH ML: at 08:00

## 2021-07-25 NOTE — NUR
RN CLOSING NOTE



PT AWAKE IN BED RESTING. ON RA WITH NO SOB OR RESPIRATORY DISTRESS A/O X4 AND ENGLISH 
SPEAKING. NO CARDIAC MONITOR PRESENT. GENERALIZED EDEMA PRESENT. ON BEDREST. F/C PRESENT AND 
DRAINING WITH CLEAR YELLOW FLUID. SKIN PROBLEMS PRESENT, WOUND PICTURES IN CHART, WOUND CARE 
GIVEN. R THUMB 24G IV LINE PRESENT AND FLUSHES WELL. MIDLINE PRESENT ON R UA AND FLUSHES 
WELL. R FEMORAL HD CATH PRESENT. NPO POST MIDNIGHT FOR PERMACATH PLACEMENT WITH DR WILKINS. 
CONSENTS IN CHART. SAFETY MEASURES IN PLACE. SIDE RAILS RAISED. BED LOWERED. CALL LIGHT 
WITHIN REACH. REPORT TO BE GIVEN TO NIGHT NURSE FOR LISANDRO.

## 2021-07-25 NOTE — NUR
RN OPENING NOTE



PT AWAKE IN BED RESTING. ON 1L NC O2 FOR SOB . A/O X4 AND ENGLISH SPEAKING. NO CARDIAC 
MONITOR PRESENT. GENERALIZED EDEMA PRESENT. ON BEDREST. F/C PRESENT AND DRAINING WITH CLEAR 
YELLOW FLUID. SKIN PROBLEMS PRESENT, WOUND PICTURES IN CHART, WOUND CARE ORDERED. R THUMB 
24G IV LINE PRESENT AND FLUSHES WELL. MIDLINE INSERTION ORDERED.  R FEMORAL HD CATH PRESENT. 
SAFETY MEASURES IN PLACE. SIDE RAILS RAISED. BED LOWERED. CALL LIGHT WITHIN REACH. WILL 
CONTINUE TO MONITOR.

## 2021-07-25 NOTE — NUR
RN NOTES:

TURNING AND REPOSITIONING DONE, AWAKE MOST OF THE TIME, GIVEN ICE CHIPS,NO SIGN OF SOB OR 
RESPIRATORY DISCOMFORT.NEEDS ATTENDED, CALL LIGHT KEPT WITHIN EASY REACH.

## 2021-07-25 NOTE — NUR
RN NOTES:

CONTINUE TO KEEP ON CLOSE WATCH, FALL AND SAFETY PRECAUTION OBSERVED, GIVEN ICE CHIPS, 
ENDORSED TO 

F/U MIDLINE INSERTION IN THE MORNING, KEPT BUE,BLE ELEVATED. NEEDS ATTENDED.ENDORSED FOR 
CONTINUITY OF CARE.

-------------------------------------------------------------------------------

Addendum: 07/25/21 at 0711 by SAAD ALVARADO RN

-------------------------------------------------------------------------------

ADDED NOTES;

--FOR LABS IN MORNING,  URINE OUTPUT-350CC, NO BM

## 2021-07-25 NOTE — NUR
MS RN Opening Notes



Patient was last seen sleeping in bed. Patient's alert and oriented x3 with periods of 
confusion. Patient's on room air with no respiratory distress noted. Patient has a right 
upper arm midline gauge #18. Patient has a murcia catheter.  Patient's in no acute distress 
at this time. Safety measures in place: Bed locked, bed alarm on, side rails upx3, and call 
light within reach. Will continue to monitor the patient.

## 2021-07-25 NOTE — NUR
RN NOTES:

TURNING AND REPOSITIONING DONE, NEEDS ATTENDED, FOR MIDLINE INSERTION IN THE MORNING, 
ENDORSED TO FOLLOW UP WITH NURSING SUPERVISOR IN THE MORNING.

## 2021-07-26 VITALS — SYSTOLIC BLOOD PRESSURE: 98 MMHG | DIASTOLIC BLOOD PRESSURE: 54 MMHG

## 2021-07-26 VITALS — DIASTOLIC BLOOD PRESSURE: 50 MMHG | SYSTOLIC BLOOD PRESSURE: 98 MMHG

## 2021-07-26 VITALS — SYSTOLIC BLOOD PRESSURE: 95 MMHG | DIASTOLIC BLOOD PRESSURE: 50 MMHG

## 2021-07-26 VITALS — SYSTOLIC BLOOD PRESSURE: 99 MMHG | DIASTOLIC BLOOD PRESSURE: 46 MMHG

## 2021-07-26 VITALS — DIASTOLIC BLOOD PRESSURE: 52 MMHG | SYSTOLIC BLOOD PRESSURE: 107 MMHG

## 2021-07-26 VITALS — DIASTOLIC BLOOD PRESSURE: 61 MMHG | SYSTOLIC BLOOD PRESSURE: 102 MMHG

## 2021-07-26 LAB
BASOPHILS # BLD AUTO: 0 K/UL (ref 0–0.2)
BASOPHILS NFR BLD AUTO: 0.2 % (ref 0–2)
BILIRUB DIRECT SERPL-MCNC: 2.9 MG/DL (ref 0–0.2)
BUN SERPL-MCNC: 58 MG/DL (ref 7–18)
CALCIUM SERPL-MCNC: 8.2 MG/DL (ref 8.5–10.1)
CHLORIDE SERPL-SCNC: 102 MMOL/L (ref 98–107)
CO2 SERPL-SCNC: 27 MMOL/L (ref 21–32)
CREAT SERPL-MCNC: 2.4 MG/DL (ref 0.6–1.3)
EOSINOPHIL NFR BLD AUTO: 0.3 % (ref 0–6)
GLUCOSE SERPL-MCNC: 90 MG/DL (ref 74–106)
HCT VFR BLD AUTO: 36 % (ref 39–51)
HGB BLD-MCNC: 11 G/DL (ref 13.5–17.5)
LYMPHOCYTES NFR BLD AUTO: 0.8 K/UL (ref 0.8–4.8)
LYMPHOCYTES NFR BLD AUTO: 6.1 % (ref 20–44)
MCHC RBC AUTO-ENTMCNC: 31 G/DL (ref 31–36)
MCV RBC AUTO: 87 FL (ref 80–96)
MONOCYTES NFR BLD AUTO: 0.9 K/UL (ref 0.1–1.3)
MONOCYTES NFR BLD AUTO: 6.9 % (ref 2–12)
NEUTROPHILS # BLD AUTO: 11 K/UL (ref 1.8–8.9)
NEUTROPHILS NFR BLD AUTO: 86.5 % (ref 43–81)
PLATELET # BLD AUTO: 111 K/UL (ref 150–450)
POTASSIUM SERPL-SCNC: 4.6 MMOL/L (ref 3.5–5.1)
RBC # BLD AUTO: 4.1 MIL/UL (ref 4.5–6)
SODIUM SERPL-SCNC: 137 MMOL/L (ref 136–145)
WBC NRBC COR # BLD AUTO: 12.7 K/UL (ref 4.3–11)

## 2021-07-26 PROCEDURE — 0JHD3XZ INSERTION OF TUNNELED VASCULAR ACCESS DEVICE INTO RIGHT UPPER ARM SUBCUTANEOUS TISSUE AND FASCIA, PERCUTANEOUS APPROACH: ICD-10-PCS | Performed by: SURGERY

## 2021-07-26 PROCEDURE — 05HM33Z INSERTION OF INFUSION DEVICE INTO RIGHT INTERNAL JUGULAR VEIN, PERCUTANEOUS APPROACH: ICD-10-PCS | Performed by: SURGERY

## 2021-07-26 PROCEDURE — B543ZZA ULTRASONOGRAPHY OF RIGHT JUGULAR VEINS, GUIDANCE: ICD-10-PCS | Performed by: SURGERY

## 2021-07-26 RX ADMIN — PANTOPRAZOLE SODIUM SCH MG: 40 TABLET, DELAYED RELEASE ORAL at 07:30

## 2021-07-26 RX ADMIN — MEROPENEM SCH MLS/HR: 500 INJECTION INTRAVENOUS at 09:20

## 2021-07-26 RX ADMIN — CLOTRIMAZOLE SCH APPLIC: 1 CREAM TOPICAL at 17:23

## 2021-07-26 RX ADMIN — CLOTRIMAZOLE SCH APPLIC: 1 CREAM TOPICAL at 09:18

## 2021-07-26 RX ADMIN — ASPIRIN 81 MG SCH MG: 81 TABLET ORAL at 08:08

## 2021-07-26 RX ADMIN — FUROSEMIDE SCH MG: 10 INJECTION, SOLUTION INTRAMUSCULAR; INTRAVENOUS at 08:45

## 2021-07-26 RX ADMIN — Medication SCH ML: at 08:00

## 2021-07-26 RX ADMIN — TEMAZEPAM PRN MG: 7.5 CAPSULE ORAL at 22:42

## 2021-07-26 RX ADMIN — Medication SCH ML: at 17:24

## 2021-07-26 RX ADMIN — Medication SCH TAB: at 08:09

## 2021-07-26 RX ADMIN — FERROUS SULFATE TAB 325 MG (65 MG ELEMENTAL FE) SCH MG: 325 (65 FE) TAB at 17:20

## 2021-07-26 RX ADMIN — FERROUS SULFATE TAB 325 MG (65 MG ELEMENTAL FE) SCH MG: 325 (65 FE) TAB at 08:08

## 2021-07-26 NOTE — NUR
RN NOTES



MONITORED PT'S R-GROIN S/P HD CATH REMOVAL Q15 MIN X2 HRS, WITH NO ACTIVE BLEEDING NOTED, 
DRESSING C/D/I.

## 2021-07-26 NOTE — NUR
MS RN CLOSING NOTES



PT IN BED, EASILY AROUSABLE TO STIMULI, A/O X2-3, ABLE TO MAKE NEEDS KNOWN. DENIES ANY PAIN 
OR DISCOMFORT AT THIS TIME. ON O2 @2LPM VIA N/C, SPO2 95%. IV ACCESS ON L-THUMB #24G AMD MARISOL 
MIDLINE #18G BOTH INTACT, PATENT, FLUSHES WELL. FC IN PLACE DRAINING YAO URINE. PT'S 
DRESSING ON R-FEMORAL POST-HD CATH SITE WITH DRESSING C/D/I. PERMA-CATH ON R-UPPER CHEST 
WALL IN PLACE, WITH DRESSING C/D/I. ON-GOING DIALYSIS AT THIS TIME, ATTENDED BY NURSEFABY. 
PT IN NO ACUTE DISTRESS. SAFETY MEASURES MAINTAINED: BED IN LOWEST LOCKED POSITION, S/R UP 
X2, CALL LIGHT AND TABLE WITHIN REACH. WILL ENDORSE TO NEXT SHIFT NURSE.

## 2021-07-26 NOTE — NUR
MS RN OPENING NOTE

PT AWAKE IN BED AT THIS TIME. AOX3, ABLE TO MAKE NEEDS KNOWN. NO SOB NOTED. NO C/O PAIN AT 
THIS TIME, NO S/O ANY ACUTE DISTRESS NOTED. RESPIRATIONS EVEN AND UNLABORED, STABLE ON 
OXYGEN 2LNC. IV ACCESS MARISOL MIDLINE #18 INTACT and RU CHEST WALL PERM CATH IN PLACE, PATENT, 
FLUSHING WELL. PT RECEIVING DIALYSIS AT THE MOMENT. SAFETY PRECAUTIONS IN PLACE AND 
MAINTAINED AT ALL TIMES. BED IN LOWEST LOCKED POSITION, HOB ELEVATED, SIDE RAILS UP X2, CALL 
LIGHT AND TABLE WITHIN REACH. FAMILY AT BEDSIDE. FAMILY AT BEDSIDE. WILL CONTINUE TO  
MONITOR

## 2021-07-26 NOTE — NUR
MS RN OPENING NOTES



RECEIVED PT RESTING IN BED, EASILY AWAKENS WHEN NAME IS CALLED. A/O X2-3 WITH FORGETFULNESS. 
DENIES ANY PAIN OR DISCOMFORT AT THIS TIME. BREATHING EVEN, UNLABORED, ON O2 VIA NC @2LPM 
AND TOLERATING WELL. NOTED IV ACCESS ON L-THUMB @24 AND MARISOL MIDLINE #18, BOTH INTACT, PATENT 
AND FLUSHES WELL. FC IN PLACE, DRAINING YELLOW URINE. NOTED R-FEMORAL HD CATH IN PLACE WITH 
DRESSING INTACT. PT CONT. ON NPO FOR PERMA CATH PLACEMENT TODAY. PT IN NO ACUTE DISTRESS. 
SAFETY MEASURES MAINTAINED: BED IN LOWEST LOCKED POSITION, S/R UP X2, CALL LIGHT AND TABLE 
WITHIN REACH. WILL CONTINUE TO MONITOR.

## 2021-07-27 LAB
ALBUMIN SERPL BCP-MCNC: 2.9 G/DL (ref 3.4–5)
ALP SERPL-CCNC: 117 U/L (ref 46–116)
ALT SERPL W P-5'-P-CCNC: 72 U/L (ref 12–78)
AST SERPL W P-5'-P-CCNC: 33 U/L (ref 15–37)
BASOPHILS # BLD AUTO: 0 K/UL (ref 0–0.2)
BASOPHILS NFR BLD AUTO: 0.3 % (ref 0–2)
BILIRUB SERPL-MCNC: 3.7 MG/DL (ref 0.2–1)
BUN SERPL-MCNC: 41 MG/DL (ref 7–18)
CALCIUM SERPL-MCNC: 7.8 MG/DL (ref 8.5–10.1)
CHLORIDE SERPL-SCNC: 104 MMOL/L (ref 98–107)
CO2 SERPL-SCNC: 27 MMOL/L (ref 21–32)
CREAT SERPL-MCNC: 1.9 MG/DL (ref 0.6–1.3)
EOSINOPHIL NFR BLD AUTO: 0.7 % (ref 0–6)
GLUCOSE SERPL-MCNC: 100 MG/DL (ref 74–106)
HCT VFR BLD AUTO: 38 % (ref 39–51)
HGB BLD-MCNC: 11.6 G/DL (ref 13.5–17.5)
LYMPHOCYTES NFR BLD AUTO: 0.8 K/UL (ref 0.8–4.8)
LYMPHOCYTES NFR BLD AUTO: 6.7 % (ref 20–44)
LYMPHOCYTES NFR BLD MANUAL: 3 % (ref 16–48)
MAGNESIUM SERPL-MCNC: 2 MG/DL (ref 1.8–2.4)
MCHC RBC AUTO-ENTMCNC: 30 G/DL (ref 31–36)
MCV RBC AUTO: 88 FL (ref 80–96)
MONOCYTES NFR BLD AUTO: 0.9 K/UL (ref 0.1–1.3)
MONOCYTES NFR BLD AUTO: 7.3 % (ref 2–12)
MONOCYTES NFR BLD MANUAL: 7 % (ref 0–11)
NEUTROPHILS # BLD AUTO: 10.7 K/UL (ref 1.8–8.9)
NEUTROPHILS NFR BLD AUTO: 85 % (ref 43–81)
NEUTS SEG NFR BLD MANUAL: 90 % (ref 42–76)
PHOSPHATE SERPL-MCNC: 3.8 MG/DL (ref 2.5–4.9)
PLATELET # BLD AUTO: 78 K/UL (ref 150–450)
POTASSIUM SERPL-SCNC: 4 MMOL/L (ref 3.5–5.1)
PROT SERPL-MCNC: 5.2 G/DL (ref 6.4–8.2)
RBC # BLD AUTO: 4.36 MIL/UL (ref 4.5–6)
SODIUM SERPL-SCNC: 140 MMOL/L (ref 136–145)
WBC NRBC COR # BLD AUTO: 12.6 K/UL (ref 4.3–11)

## 2021-07-27 RX ADMIN — FUROSEMIDE SCH MG: 10 INJECTION, SOLUTION INTRAMUSCULAR; INTRAVENOUS at 17:12

## 2021-07-27 RX ADMIN — FERROUS SULFATE TAB 325 MG (65 MG ELEMENTAL FE) SCH MG: 325 (65 FE) TAB at 11:09

## 2021-07-27 RX ADMIN — PANTOPRAZOLE SODIUM SCH MG: 40 TABLET, DELAYED RELEASE ORAL at 11:09

## 2021-07-27 RX ADMIN — FUROSEMIDE SCH MG: 10 INJECTION, SOLUTION INTRAMUSCULAR; INTRAVENOUS at 11:09

## 2021-07-27 RX ADMIN — Medication SCH ML: at 08:00

## 2021-07-27 RX ADMIN — Medication SCH ML: at 17:12

## 2021-07-27 RX ADMIN — CLOTRIMAZOLE SCH APPLIC: 1 CREAM TOPICAL at 17:12

## 2021-07-27 RX ADMIN — CLOTRIMAZOLE SCH APPLIC: 1 CREAM TOPICAL at 09:00

## 2021-07-27 RX ADMIN — FERROUS SULFATE TAB 325 MG (65 MG ELEMENTAL FE) SCH MG: 325 (65 FE) TAB at 17:12

## 2021-07-27 RX ADMIN — Medication SCH TAB: at 11:09

## 2021-07-27 RX ADMIN — ASPIRIN 81 MG SCH MG: 81 TABLET ORAL at 11:09
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
## 2021-07-27 NOTE — NUR
MS RN OPENING NOTES 



PATIENT IS IN BED WITH EYES CLOSED, EASY TO AROUSE AND AOx3. PT IS STABLE ON OXYGEN 2L NC 
WITH NO SOB NOTED AND NO S/S OF RESPIRATORY DISTRESS. IV ACCESS IS INTACT, PATENT, AND 
FLUSHING WELL. GODINEZ CATHETER SECURED TO LEG AND DRAINING YELLOW, CLEAR URINE. BED IN LOWEST 
LOCKED POSITION, HOB ELEVATED, SIDE RAILS UPX2. CALL LIGHT AND TABLE WITHIN REACH.

## 2021-07-27 NOTE — NUR
RN NOTES



PATIENT REPORT AND DISCHARGE INSTRUCTIONS/EDUCATION GIVEN TO MALKA RN SUPERVISOR, AT [facility]. PATIENT GOING TO ROOM 28-A

## 2022-09-14 NOTE — NUR
ATTEMPTED TO GIVEREPORT. NURSE WILL CALL BACK Rhombic Flap Text: Because of the size and full-thickness nature of the defect, and in order to maintain form and function while avoiding distortion of the nearby nasal tip and ala, a rhombic transposition flap was planned. After prep and anesthesia, a Burow's triangle was excised laterally, just superior to the alar groove. The rhombic flap was carefully incised superior to the defect.  The flap was raised and the wound edges were all undermined in the subcutaneous plane. After hemostasis, the flap was transposed into the defect and sutured in a layered fashion.

## 2022-10-24 NOTE — NUR
tele rn notes

Bladder scan done  with 173 ml of urine  noted , bladder  not  distended will continue to 
monitor pts. Hydroquinone Counseling:  Patient advised that medication may result in skin irritation, lightening (hypopigmentation), dryness, and burning.  In the event of skin irritation, the patient was advised to reduce the amount of the drug applied or use it less frequently.  Rarely, spots that are treated with hydroquinone can become darker (pseudoochronosis).  Should this occur, patient instructed to stop medication and call the office. The patient verbalized understanding of the proper use and possible adverse effects of hydroquinone.  All of the patient's questions and concerns were addressed.